# Patient Record
Sex: MALE | Race: WHITE | Employment: FULL TIME | URBAN - METROPOLITAN AREA
[De-identification: names, ages, dates, MRNs, and addresses within clinical notes are randomized per-mention and may not be internally consistent; named-entity substitution may affect disease eponyms.]

---

## 2017-11-14 ENCOUNTER — OFFICE VISIT (OUTPATIENT)
Dept: PODIATRY | Age: 52
End: 2017-11-14
Payer: COMMERCIAL

## 2017-11-14 VITALS
DIASTOLIC BLOOD PRESSURE: 93 MMHG | WEIGHT: 280 LBS | HEART RATE: 90 BPM | BODY MASS INDEX: 35.94 KG/M2 | SYSTOLIC BLOOD PRESSURE: 141 MMHG | HEIGHT: 74 IN

## 2017-11-14 DIAGNOSIS — Q82.8 POROKERATOSIS: ICD-10-CM

## 2017-11-14 DIAGNOSIS — L98.9 BENIGN SKIN LESION: Primary | ICD-10-CM

## 2017-11-14 DIAGNOSIS — M79.672 PAIN IN LEFT FOOT: ICD-10-CM

## 2017-11-14 DIAGNOSIS — L85.3 XEROSIS CUTIS: ICD-10-CM

## 2017-11-14 PROCEDURE — 4004F PT TOBACCO SCREEN RCVD TLK: CPT | Performed by: PODIATRIST

## 2017-11-14 PROCEDURE — G8484 FLU IMMUNIZE NO ADMIN: HCPCS | Performed by: PODIATRIST

## 2017-11-14 PROCEDURE — G8427 DOCREV CUR MEDS BY ELIG CLIN: HCPCS | Performed by: PODIATRIST

## 2017-11-14 PROCEDURE — 3017F COLORECTAL CA SCREEN DOC REV: CPT | Performed by: PODIATRIST

## 2017-11-14 PROCEDURE — 99203 OFFICE O/P NEW LOW 30 MIN: CPT | Performed by: PODIATRIST

## 2017-11-14 PROCEDURE — G8419 CALC BMI OUT NRM PARAM NOF/U: HCPCS | Performed by: PODIATRIST

## 2017-11-14 RX ORDER — UREA 40 %
CREAM (GRAM) TOPICAL
Qty: 1 TUBE | Refills: 3 | Status: SHIPPED | OUTPATIENT
Start: 2017-11-14 | End: 2019-05-08

## 2017-11-14 RX ORDER — ALBUTEROL SULFATE 90 UG/1
2 AEROSOL, METERED RESPIRATORY (INHALATION)
COMMUNITY
Start: 2017-10-09 | End: 2019-05-08

## 2017-11-14 RX ORDER — OMEPRAZOLE 20 MG/1
20 CAPSULE, DELAYED RELEASE ORAL
COMMUNITY
Start: 2017-09-07 | End: 2019-05-08

## 2017-11-14 RX ORDER — LOSARTAN POTASSIUM 50 MG/1
TABLET ORAL
COMMUNITY
Start: 2017-11-14 | End: 2019-04-03 | Stop reason: SDUPTHER

## 2017-11-14 RX ORDER — VARENICLINE TARTRATE 25 MG
KIT ORAL
COMMUNITY
Start: 2017-10-02 | End: 2019-05-08

## 2019-04-03 PROBLEM — R11.10 VOMITING: Status: ACTIVE | Noted: 2018-12-05

## 2019-04-03 PROBLEM — M54.41 CHRONIC RIGHT-SIDED LOW BACK PAIN WITH RIGHT-SIDED SCIATICA: Status: ACTIVE | Noted: 2017-03-09

## 2019-04-03 PROBLEM — K02.9 INFECTED DENTAL CARIES: Status: ACTIVE | Noted: 2018-04-04

## 2019-04-03 PROBLEM — J98.01 BRONCHOSPASM: Status: ACTIVE | Noted: 2018-12-05

## 2019-04-03 PROBLEM — I10 ESSENTIAL HYPERTENSION: Status: ACTIVE | Noted: 2017-09-07

## 2019-04-03 PROBLEM — Z13.220 SCREENING FOR LIPID DISORDERS: Status: ACTIVE | Noted: 2019-04-03

## 2019-04-03 PROBLEM — G89.29 CHRONIC RIGHT-SIDED LOW BACK PAIN WITH RIGHT-SIDED SCIATICA: Status: ACTIVE | Noted: 2017-03-09

## 2019-04-03 PROBLEM — Z76.89 ENCOUNTER TO ESTABLISH CARE WITH NEW DOCTOR: Status: ACTIVE | Noted: 2019-04-03

## 2019-04-03 PROBLEM — J44.1 COPD EXACERBATION (HCC): Status: ACTIVE | Noted: 2018-12-05

## 2019-04-03 PROBLEM — K04.7 INFECTED DENTAL CARIES: Status: ACTIVE | Noted: 2018-04-04

## 2019-04-03 PROBLEM — F17.210 CIGARETTE NICOTINE DEPENDENCE WITHOUT COMPLICATION: Status: ACTIVE | Noted: 2017-03-09

## 2019-04-03 PROBLEM — K21.9 GASTROESOPHAGEAL REFLUX DISEASE WITHOUT ESOPHAGITIS: Status: ACTIVE | Noted: 2017-09-07

## 2019-04-03 PROBLEM — E78.5 DYSLIPIDEMIA: Status: ACTIVE | Noted: 2018-04-04

## 2019-04-24 PROBLEM — M67.911 DYSFUNCTION OF RIGHT ROTATOR CUFF: Status: ACTIVE | Noted: 2019-04-24

## 2019-04-24 PROBLEM — M50.30 DDD (DEGENERATIVE DISC DISEASE), CERVICAL: Status: ACTIVE | Noted: 2019-04-24

## 2019-05-03 PROBLEM — Z13.220 SCREENING FOR LIPID DISORDERS: Status: RESOLVED | Noted: 2019-04-03 | Resolved: 2019-05-03

## 2019-05-08 PROBLEM — E66.811 CLASS 1 OBESITY DUE TO EXCESS CALORIES WITH SERIOUS COMORBIDITY AND BODY MASS INDEX (BMI) OF 33.0 TO 33.9 IN ADULT: Status: ACTIVE | Noted: 2019-05-08

## 2019-05-08 PROBLEM — E66.09 CLASS 1 OBESITY DUE TO EXCESS CALORIES WITH SERIOUS COMORBIDITY AND BODY MASS INDEX (BMI) OF 33.0 TO 33.9 IN ADULT: Status: ACTIVE | Noted: 2019-05-08

## 2019-06-19 PROBLEM — M75.81 ROTATOR CUFF TENDINITIS, RIGHT: Status: ACTIVE | Noted: 2019-04-24

## 2019-06-19 PROBLEM — Z01.818 PREOP EXAMINATION: Status: ACTIVE | Noted: 2019-06-19

## 2019-07-19 PROBLEM — Z01.818 PREOP EXAMINATION: Status: RESOLVED | Noted: 2019-06-19 | Resolved: 2019-07-19

## 2019-07-29 ENCOUNTER — OFFICE VISIT (OUTPATIENT)
Dept: PODIATRY | Age: 54
End: 2019-07-29
Payer: COMMERCIAL

## 2019-07-29 VITALS — HEIGHT: 74 IN | BODY MASS INDEX: 34.01 KG/M2 | WEIGHT: 265 LBS

## 2019-07-29 DIAGNOSIS — M79.675 PAIN IN TOES OF BOTH FEET: ICD-10-CM

## 2019-07-29 DIAGNOSIS — B35.1 ONYCHOMYCOSIS: Primary | ICD-10-CM

## 2019-07-29 DIAGNOSIS — L85.3 XEROSIS CUTIS: ICD-10-CM

## 2019-07-29 DIAGNOSIS — M79.674 PAIN IN TOES OF BOTH FEET: ICD-10-CM

## 2019-07-29 PROCEDURE — 11721 DEBRIDE NAIL 6 OR MORE: CPT | Performed by: PODIATRIST

## 2019-07-29 PROCEDURE — G8427 DOCREV CUR MEDS BY ELIG CLIN: HCPCS | Performed by: PODIATRIST

## 2019-07-29 PROCEDURE — 3017F COLORECTAL CA SCREEN DOC REV: CPT | Performed by: PODIATRIST

## 2019-07-29 PROCEDURE — 99213 OFFICE O/P EST LOW 20 MIN: CPT | Performed by: PODIATRIST

## 2019-07-29 PROCEDURE — G8417 CALC BMI ABV UP PARAM F/U: HCPCS | Performed by: PODIATRIST

## 2019-07-29 PROCEDURE — 4004F PT TOBACCO SCREEN RCVD TLK: CPT | Performed by: PODIATRIST

## 2019-07-29 RX ORDER — UREA 40 %
CREAM (GRAM) TOPICAL
Qty: 1 TUBE | Refills: 3 | Status: SHIPPED | OUTPATIENT
Start: 2019-07-29 | End: 2022-05-16

## 2019-07-29 RX ORDER — HYDROCODONE BITARTRATE AND ACETAMINOPHEN 5; 325 MG/1; MG/1
TABLET ORAL
COMMUNITY
End: 2020-10-21

## 2019-07-29 ASSESSMENT — ENCOUNTER SYMPTOMS
COLOR CHANGE: 0
DIARRHEA: 0
NAUSEA: 0
VOMITING: 0
CONSTIPATION: 0

## 2019-07-30 NOTE — PROGRESS NOTES
[facility]  Return Patient    Chief Complaint   Patient presents with    Nail Problem     b/l nail trim       Subjective: This Rowdy Whiting comes to clinic for foot and nail care. Pt currently has complaint of thickened, painful, elongated nails that he/she cannot manage by themselves. Pt. Relates pain to nails with shoe gear. Pt's primary care physician is Gamaliel Felix MD. Also has dry skin both feet. Past Medical History:   Diagnosis Date    Hypertension        No Known Allergies  Current Outpatient Medications on File Prior to Visit   Medication Sig Dispense Refill    HYDROcodone-acetaminophen (NORCO) 5-325 MG per tablet Norco 5 mg-325 mg tablet   Take 1 tablet every 6 hours by oral route.  budesonide-formoterol (SYMBICORT) 160-4.5 MCG/ACT AERO Inhale 2 puffs into the lungs 2 times daily 1 Inhaler 2    ranitidine (ZANTAC) 75 MG tablet Take 75 mg by mouth 2 times daily      losartan (COZAAR) 50 MG tablet Take 1 tablet by mouth daily 90 tablet 0     No current facility-administered medications on file prior to visit. Review of Systems   Constitutional: Negative for chills, diaphoresis, fatigue, fever and unexpected weight change. Cardiovascular: Negative for leg swelling. Gastrointestinal: Negative for constipation, diarrhea, nausea and vomiting. Musculoskeletal: Negative for arthralgias, gait problem and joint swelling. Skin: Negative for color change, pallor, rash and wound. Neurological: Negative for weakness and numbness. Objective:  General: AAO x 3 in NAD.     Derm  Toenail Description  Sites of Onychomycosis Involvement (Check affected area)  [x] [x] [x] [x] [x] [x] [x] [x] [x] [x]  5 4 3 2 1 1 2 3 4 5                          Right                                        Left    Thickness  [x] [x] [x] [x] [x] [x] [x] [x] [x] [x]  5 4 3 2 1 1 2 3 4 5                         Right                                        Left    Dystrophic Changes

## 2020-01-23 ENCOUNTER — OFFICE VISIT (OUTPATIENT)
Dept: FAMILY MEDICINE CLINIC | Age: 55
End: 2020-01-23
Payer: COMMERCIAL

## 2020-01-23 VITALS
HEIGHT: 74 IN | DIASTOLIC BLOOD PRESSURE: 87 MMHG | SYSTOLIC BLOOD PRESSURE: 146 MMHG | BODY MASS INDEX: 33.42 KG/M2 | OXYGEN SATURATION: 97 % | WEIGHT: 260.4 LBS | HEART RATE: 77 BPM

## 2020-01-23 PROCEDURE — 90686 IIV4 VACC NO PRSV 0.5 ML IM: CPT | Performed by: FAMILY MEDICINE

## 2020-01-23 PROCEDURE — 90471 IMMUNIZATION ADMIN: CPT | Performed by: FAMILY MEDICINE

## 2020-01-23 PROCEDURE — 4004F PT TOBACCO SCREEN RCVD TLK: CPT | Performed by: FAMILY MEDICINE

## 2020-01-23 PROCEDURE — G8926 SPIRO NO PERF OR DOC: HCPCS | Performed by: FAMILY MEDICINE

## 2020-01-23 PROCEDURE — 99204 OFFICE O/P NEW MOD 45 MIN: CPT | Performed by: FAMILY MEDICINE

## 2020-01-23 PROCEDURE — 3023F SPIROM DOC REV: CPT | Performed by: FAMILY MEDICINE

## 2020-01-23 PROCEDURE — 3017F COLORECTAL CA SCREEN DOC REV: CPT | Performed by: FAMILY MEDICINE

## 2020-01-23 PROCEDURE — G8427 DOCREV CUR MEDS BY ELIG CLIN: HCPCS | Performed by: FAMILY MEDICINE

## 2020-01-23 PROCEDURE — G8417 CALC BMI ABV UP PARAM F/U: HCPCS | Performed by: FAMILY MEDICINE

## 2020-01-23 PROCEDURE — G8482 FLU IMMUNIZE ORDER/ADMIN: HCPCS | Performed by: FAMILY MEDICINE

## 2020-01-23 RX ORDER — OMEPRAZOLE 20 MG/1
20 CAPSULE, DELAYED RELEASE ORAL DAILY
COMMUNITY
End: 2022-05-16 | Stop reason: ALTCHOICE

## 2020-01-23 RX ORDER — VARENICLINE TARTRATE 1 MG/1
1 TABLET, FILM COATED ORAL 2 TIMES DAILY
Qty: 60 TABLET | Refills: 5 | Status: SHIPPED | OUTPATIENT
Start: 2020-01-23

## 2020-01-23 RX ORDER — VARENICLINE TARTRATE 25 MG
KIT ORAL
Qty: 53 TABLET | Refills: 0 | Status: SHIPPED | OUTPATIENT
Start: 2020-01-23

## 2020-01-23 ASSESSMENT — PATIENT HEALTH QUESTIONNAIRE - PHQ9
1. LITTLE INTEREST OR PLEASURE IN DOING THINGS: 0
2. FEELING DOWN, DEPRESSED OR HOPELESS: 0
SUM OF ALL RESPONSES TO PHQ9 QUESTIONS 1 & 2: 0
SUM OF ALL RESPONSES TO PHQ QUESTIONS 1-9: 0
SUM OF ALL RESPONSES TO PHQ QUESTIONS 1-9: 0

## 2020-01-23 NOTE — PROGRESS NOTES
[facility]  [address] [place] [address] Dr ARTHUR [address] [address]  Dept: [phone]      Ibeth Benitez is a 47 y.o. male who presents today for follow up on his  medical conditions as noted below. Chief Complaint   Patient presents with   [facility] Establish Care       Patient Active Problem List:     BMI 33.0-33.9,adult     Bronchospasm     Chronic right-sided low back pain with right-sided sciatica     Cigarette nicotine dependence without complication     COPD exacerbation (HCC)     Dyslipidemia     Essential hypertension     Gastroesophageal reflux disease without esophagitis     Infected tooth     Vomiting     DDD (degenerative disc disease), cervical     Rotator cuff tendinitis, right     Class 1 obesity due to excess calories with serious comorbidity and body mass index (BMI) of 33.0 to 33.9 in adult     Past Medical History:   Diagnosis Date    Hypertension       Past Surgical History:   Procedure Laterality Date    HERNIA REPAIR       Family History   Problem Relation Age of Onset    High Blood Pressure Mother     Cancer Father         passed away from cancer    Diabetes Daughter        Current Outpatient Medications   Medication Sig Dispense Refill    omeprazole (PRILOSEC) 20 MG delayed release capsule Take 20 mg by mouth daily      varenicline (CHANTIX STARTING MONTH SARAHI) 0.5 MG X 11 & 1 MG X 42 tablet Take as directed on package.  53 tablet 0    varenicline (CHANTIX CONTINUING MONTH SARAHI) 1 MG tablet Take 1 tablet by mouth 2 times daily 60 tablet 5    albuterol sulfate  (90 Base) MCG/ACT inhaler Inhale 2 puffs into the lungs every 4 hours as needed for Wheezing or Shortness of Breath 1 Inhaler 3    budesonide-formoterol (SYMBICORT) 160-4.5 MCG/ACT AERO Inhale 2 puffs into the lungs 2 times daily 1 Inhaler 3    amLODIPine (NORVASC) 10 MG tablet Take 1 tablet by mouth daily 30 tablet 3    losartan (COZAAR) 50 MG tablet Take 1 tablet by mouth daily (Patient taking differently: Take 100 mg by mouth daily ) 90 tablet 0    Roflumilast (DALIRESP) 500 MCG tablet Take 1 tablet by mouth daily (Patient not taking: Reported on 1/23/2020) 30 tablet 3    HYDROcodone-acetaminophen (NORCO) 5-325 MG per tablet Norco 5 mg-325 mg tablet   Take 1 tablet every 6 hours by oral route.  Urea (CARMOL) 40 % cream Use daily as needed (Patient not taking: Reported on 1/23/2020) 1 Tube 3    ranitidine (ZANTAC) 75 MG tablet Take 75 mg by mouth 2 times daily       No current facility-administered medications for this visit. ALLERGIES:  No Known Allergies    Social History     Tobacco Use    Smoking status: Current Every Day Smoker     Packs/day: 1.00     Years: 40.00     Pack years: 40.00     Types: Cigarettes    Smokeless tobacco: Never Used   Substance Use Topics    Alcohol use: No        LDL Calculated (mg/dL)   Date Value   04/24/2019 99     HDL (mg/dL)   Date Value   04/24/2019 32 (A)     BUN (mg/dL)   Date Value   04/24/2019 8     CREATININE (no units)   Date Value   04/24/2019 1.07     Glucose (mg/dL)   Date Value   04/24/2019 92              Subjective:      HPI  He is here today as a new patient to establish care he has a history of high blood pressure amlodipine was recently added to his medication regime he states he did take his medicine this morning is also hypercholesterolemic he has COPD he is willing to try and quit smoking and he mentions that he is having some mild generalized lower abdominal discomfort and has had a couple days of rectal bleeding    Review of Systems:     Constitutional: Negative for fever, appetite change and fatigue. Family social and medical history reviewed and unchanged     HENT: Negative. Negative for nosebleeds, trouble swallowing and neck pain. Eyes: Negative for photophobia and visual disturbance. Respiratory: Negative. Negative for chest tightness and shortness of breath. Cardiovascular: Negative.

## 2020-01-23 NOTE — PROGRESS NOTES
Vaccine Information Sheet, \"Influenza - Inactivated\"  given to Clevester Claude, or parent/legal guardian of  Clevester Claude and verbalized understanding. Patient responses:    Have you ever had a reaction to a flu vaccine? No  Do you have any current illness? No  Have you ever had [place] Syndrome? No  Do you have a serious allergy to any of the following: Neomycin, Polymyxin, Thimerosal, eggs or egg products? No    Flu vaccine given per order. Please see immunization tab. Risks and benefits explained. Current VIS given.

## 2020-02-05 ENCOUNTER — HOSPITAL ENCOUNTER (OUTPATIENT)
Age: 55
Setting detail: SPECIMEN
Discharge: HOME | End: 2020-02-05
Payer: COMMERCIAL

## 2020-02-12 LAB — SURGICAL PATHOLOGY REPORT: NORMAL

## 2020-02-14 ENCOUNTER — TELEPHONE (OUTPATIENT)
Dept: FAMILY MEDICINE CLINIC | Age: 55
End: 2020-02-14

## 2020-02-14 NOTE — TELEPHONE ENCOUNTER
Patient's wife is calling and asking for him to have some nicotine patches- 21 mg. He had these when he was in the hospital at [facility]. They were really working good for him. She doesn't want him to smoke again. She gave him some really old ones she had and he has started smoke one to two cigarettes with these old patches per day.

## 2020-02-17 RX ORDER — NICOTINE 21 MG/24HR
1 PATCH, TRANSDERMAL 24 HOURS TRANSDERMAL EVERY 24 HOURS
Qty: 30 PATCH | Refills: 3 | COMMUNITY
Start: 2020-02-17 | End: 2020-02-18 | Stop reason: SDUPTHER

## 2020-02-17 RX ORDER — NICOTINE 21 MG/24HR
1 PATCH, TRANSDERMAL 24 HOURS TRANSDERMAL EVERY 24 HOURS
Qty: 30 PATCH | Refills: 3 | Status: SHIPPED | OUTPATIENT
Start: 2020-02-17 | End: 2021-02-16

## 2020-02-18 RX ORDER — NICOTINE 21 MG/24HR
1 PATCH, TRANSDERMAL 24 HOURS TRANSDERMAL EVERY 24 HOURS
Qty: 30 PATCH | Refills: 3 | Status: SHIPPED | OUTPATIENT
Start: 2020-02-18

## 2020-06-02 RX ORDER — LOSARTAN POTASSIUM 50 MG/1
100 TABLET ORAL DAILY
Qty: 180 TABLET | Refills: 0 | Status: SHIPPED | OUTPATIENT
Start: 2020-06-02 | End: 2022-05-16 | Stop reason: DRUGHIGH

## 2020-10-21 ENCOUNTER — OFFICE VISIT (OUTPATIENT)
Dept: PODIATRY | Age: 55
End: 2020-10-21
Payer: COMMERCIAL

## 2020-10-21 VITALS — HEIGHT: 74 IN | BODY MASS INDEX: 34.14 KG/M2 | WEIGHT: 266 LBS

## 2020-10-21 PROCEDURE — 11730 AVULSION NAIL PLATE SIMPLE 1: CPT | Performed by: PODIATRIST

## 2020-10-21 PROCEDURE — G8484 FLU IMMUNIZE NO ADMIN: HCPCS | Performed by: PODIATRIST

## 2020-10-21 PROCEDURE — G8417 CALC BMI ABV UP PARAM F/U: HCPCS | Performed by: PODIATRIST

## 2020-10-21 PROCEDURE — 99213 OFFICE O/P EST LOW 20 MIN: CPT | Performed by: PODIATRIST

## 2020-10-21 PROCEDURE — 11721 DEBRIDE NAIL 6 OR MORE: CPT | Performed by: PODIATRIST

## 2020-10-21 PROCEDURE — 17110 DESTRUCTION B9 LES UP TO 14: CPT | Performed by: PODIATRIST

## 2020-10-21 PROCEDURE — G8427 DOCREV CUR MEDS BY ELIG CLIN: HCPCS | Performed by: PODIATRIST

## 2020-10-21 PROCEDURE — 3017F COLORECTAL CA SCREEN DOC REV: CPT | Performed by: PODIATRIST

## 2020-10-21 PROCEDURE — 4004F PT TOBACCO SCREEN RCVD TLK: CPT | Performed by: PODIATRIST

## 2020-10-21 RX ORDER — TERBINAFINE HYDROCHLORIDE 250 MG/1
250 TABLET ORAL DAILY
Qty: 90 TABLET | Refills: 0 | Status: SHIPPED | OUTPATIENT
Start: 2020-10-21 | End: 2021-01-19

## 2020-10-21 ASSESSMENT — ENCOUNTER SYMPTOMS
DIARRHEA: 0
COLOR CHANGE: 0
NAUSEA: 0
CONSTIPATION: 0
VOMITING: 0

## 2020-10-21 NOTE — PROGRESS NOTES
[facility]  Return Patient    Chief Complaint   Patient presents with    Nail Problem     b/l nail trim/ last seen Dr. Sondra Antonio 1/23/20       Subjective: This Breanne Cole comes to clinic for foot and nail care. Pt currently has complaint of thickened, painful, elongated nails that he/she cannot manage by themselves. Pt. Relates pain to nails with shoe gear. Pt's primary care physician is [address] [place] [address], . Also has dry skin both feet. I have not seen him in over a year. He also has a very long, ingrown nail left 4th toe, he cannot cut. He has a painful callous left lateral foot. Past Medical History:   Diagnosis Date    Hypertension        No Known Allergies  Current Outpatient Medications on File Prior to Visit   Medication Sig Dispense Refill    Adalimumab 80 MG/0.8ML PNKT Humira(CF) Pen Crohn's-Ulc Colitis-Hid Sup Strt 80 mg/0.8 mL subcut kt      nicotine (NICODERM CQ) 21 MG/24HR Place 1 patch onto the skin every 24 hours 30 patch 3    nicotine (NICODERM CQ) 14 MG/24HR Place 1 patch onto the skin every 24 hours 30 patch 3    nicotine (NICODERM CQ) 7 MG/24HR Place 1 patch onto the skin every 24 hours 30 patch 3    omeprazole (PRILOSEC) 20 MG delayed release capsule Take 20 mg by mouth daily      varenicline (CHANTIX STARTING MONTH PAK) 0.5 MG X 11 & 1 MG X 42 tablet Take as directed on package.  53 tablet 0    varenicline (CHANTIX CONTINUING MONTH PAK) 1 MG tablet Take 1 tablet by mouth 2 times daily 60 tablet 5    albuterol sulfate  (90 Base) MCG/ACT inhaler Inhale 2 puffs into the lungs every 4 hours as needed for Wheezing or Shortness of Breath 1 Inhaler 3    budesonide-formoterol (SYMBICORT) 160-4.5 MCG/ACT AERO Inhale 2 puffs into the lungs 2 times daily 1 Inhaler 3    amLODIPine (NORVASC) 10 MG tablet Take 1 tablet by mouth daily 30 tablet 3    Roflumilast (DALIRESP) 500 MCG tablet Take 1 tablet by mouth daily 30 tablet 3    Urea (CARMOL) 40 % cream Use daily as needed 1 Tube 3    losartan (COZAAR) 50 MG tablet Take 2 tablets by mouth daily 180 tablet 0     No current facility-administered medications on file prior to visit. Review of Systems   Constitutional: Negative for chills, diaphoresis, fatigue, fever and unexpected weight change. Cardiovascular: Negative for leg swelling. Gastrointestinal: Negative for constipation, diarrhea, nausea and vomiting. Musculoskeletal: Negative for arthralgias, gait problem and joint swelling. Skin: Negative for color change, pallor, rash and wound. Neurological: Negative for weakness and numbness. Objective:  General: AAO x 3 in NAD. Derm  Toenail Description  Sites of Onychomycosis Involvement (Check affected area)  [x] [x] [x] [x] [x] [x] [x] [x] [x] [x]  5 4 3 2 1 1 2 3 4 5                          Right                                        Left    Thickness  [x] [x] [x] [x] [x] [x] [x] [x] [x] [x]  5 4 3 2 1 1 2 3 4 5                         Right                                        Left    Dystrophic Changes   [x] [x] [x] [x] [x] [x] [x] [x] [x] [x]  5 4 3 2 1 1 2 3 4 5                         Right                                        Left    Color  [x] [x] [x] [x] [x] [x] [x] [x] [x] [x]  5 4 3 2 1 1 2 3 4 5                          Right                                        Left    Incurvation/Ingrowin   [] [] [] [] [] [] [] [] [] []  5 4 3 2 1 1 2 3 4 5                         Right                                        Left    Inflammation/Pain   [x] [x] [x] [x] [x] [x] [x] [x] [x] [x]  5 4 3 2 1 1 2 3 4 5                         Right                                        Left       Nails are painful 1-10 with direct palpation. dry, hyperkeratotic skin bilateral feet. Hyperkeratotic lesion plantar left 5th met head with a hard core, painful.    Left 4th toenail extremely thick, incurvated, red, painful    Vascular:  DP/PT pulses palpable 2/4, Bilateral.    CFT <3 seconds to digits with antibiotic oinment/amerigel. The patient tolerated the procedure well without apparent complications. Oral and written instructions were dispensed. Discussed topical and oral antifungals. I have reviewed the patients past medical history and medication list, and I feel that he/she could take Lamisil as long as we monitor his/her liver. I will obtain labs, and if normal, will start Lamisil, one tablet daily for 90 days.        10/21/2020    Electronically signed by Calin Durham DPM on 10/21/2020 at 9:21 AM

## 2021-01-20 ENCOUNTER — OFFICE VISIT (OUTPATIENT)
Dept: PODIATRY | Age: 56
End: 2021-01-20
Payer: COMMERCIAL

## 2021-01-20 VITALS — WEIGHT: 266 LBS | BODY MASS INDEX: 34.14 KG/M2 | HEIGHT: 74 IN

## 2021-01-20 DIAGNOSIS — M79.675 PAIN IN TOES OF BOTH FEET: ICD-10-CM

## 2021-01-20 DIAGNOSIS — M79.672 PAIN IN LEFT FOOT: ICD-10-CM

## 2021-01-20 DIAGNOSIS — B35.1 ONYCHOMYCOSIS: Primary | ICD-10-CM

## 2021-01-20 DIAGNOSIS — M79.674 PAIN IN TOES OF BOTH FEET: ICD-10-CM

## 2021-01-20 DIAGNOSIS — D23.72 BENIGN NEOPLASM OF SKIN OF FOOT, LEFT: ICD-10-CM

## 2021-01-20 PROCEDURE — G8484 FLU IMMUNIZE NO ADMIN: HCPCS | Performed by: PODIATRIST

## 2021-01-20 PROCEDURE — 4004F PT TOBACCO SCREEN RCVD TLK: CPT | Performed by: PODIATRIST

## 2021-01-20 PROCEDURE — 3017F COLORECTAL CA SCREEN DOC REV: CPT | Performed by: PODIATRIST

## 2021-01-20 PROCEDURE — G8427 DOCREV CUR MEDS BY ELIG CLIN: HCPCS | Performed by: PODIATRIST

## 2021-01-20 PROCEDURE — G8417 CALC BMI ABV UP PARAM F/U: HCPCS | Performed by: PODIATRIST

## 2021-01-20 PROCEDURE — 99213 OFFICE O/P EST LOW 20 MIN: CPT | Performed by: PODIATRIST

## 2021-01-20 ASSESSMENT — ENCOUNTER SYMPTOMS
COLOR CHANGE: 0
VOMITING: 0
NAUSEA: 0
DIARRHEA: 0
CONSTIPATION: 0

## 2021-01-20 NOTE — PROGRESS NOTES
[facility]  Return Patient    Chief Complaint   Patient presents with   Darío Garcia     left foot     Nail Problem     nail trim/last saw Nolvia Mahmood 12/23/2020       Subjective: This Gonzalez Bombard comes to clinic for foot and nail care. Pt currently has complaint of thickened, painful, elongated nails that he/she cannot manage by themselves. Pt. Relates pain to nails with shoe gear. Pt's primary care physician is Vignesh Willingham DO. Also has dry skin both feet. Taking lamisil, has missed a few days. He has a painful callous left lateral foot. Past Medical History:   Diagnosis Date    Hypertension        No Known Allergies  Current Outpatient Medications on File Prior to Visit   Medication Sig Dispense Refill    Adalimumab 80 MG/0.8ML PNKT Humira(CF) Pen Crohn's-Ulc Colitis-Hid Sup Strt 80 mg/0.8 mL subcut kt      omeprazole (PRILOSEC) 20 MG delayed release capsule Take 20 mg by mouth daily      albuterol sulfate  (90 Base) MCG/ACT inhaler Inhale 2 puffs into the lungs every 4 hours as needed for Wheezing or Shortness of Breath 1 Inhaler 3    amLODIPine (NORVASC) 10 MG tablet Take 1 tablet by mouth daily 30 tablet 3    Roflumilast (DALIRESP) 500 MCG tablet Take 1 tablet by mouth daily 30 tablet 3    Urea (CARMOL) 40 % cream Use daily as needed 1 Tube 3    losartan (COZAAR) 50 MG tablet Take 2 tablets by mouth daily 180 tablet 0    nicotine (NICODERM CQ) 21 MG/24HR Place 1 patch onto the skin every 24 hours (Patient not taking: Reported on 1/20/2021) 30 patch 3    nicotine (NICODERM CQ) 14 MG/24HR Place 1 patch onto the skin every 24 hours (Patient not taking: Reported on 1/20/2021) 30 patch 3    nicotine (NICODERM CQ) 7 MG/24HR Place 1 patch onto the skin every 24 hours (Patient not taking: Reported on 1/20/2021) 30 patch 3    varenicline (CHANTIX STARTING MONTH SARAHI) 0.5 MG X 11 & 1 MG X 42 tablet Take as directed on package.  (Patient not taking: Reported on 1/20/2021) 53 tablet 0    varenicline (CHANTIX CONTINUING MONTH SARAHI) 1 MG tablet Take 1 tablet by mouth 2 times daily (Patient not taking: Reported on 1/20/2021) 60 tablet 5     No current facility-administered medications on file prior to visit. Review of Systems   Constitutional: Negative for chills, diaphoresis, fatigue, fever and unexpected weight change. Cardiovascular: Negative for leg swelling. Gastrointestinal: Negative for constipation, diarrhea, nausea and vomiting. Musculoskeletal: Negative for arthralgias, gait problem and joint swelling. Skin: Negative for color change, pallor, rash and wound. Neurological: Negative for weakness and numbness. Objective:  General: AAO x 3 in NAD. Derm  Toenail Description  Sites of Onychomycosis Involvement (Check affected area)  [x] [x] [x] [x] [x] [x] [x] [x] [x] [x]  5 4 3 2 1 1 2 3 4 5                          Right                                        Left    Thickness  [x] [x] [x] [x] [x] [x] [x] [x] [x] [x]  5 4 3 2 1 1 2 3 4 5                         Right                                        Left    Dystrophic Changes   [x] [x] [x] [x] [x] [x] [x] [x] [x] [x]  5 4 3 2 1 1 2 3 4 5                         Right                                        Left    Color  [x] [x] [x] [x] [x] [x] [x] [x] [x] [x]  5 4 3 2 1 1 2 3 4 5                          Right                                        Left    Incurvation/Ingrowin   [] [] [] [] [] [] [] [] [] []  5 4 3 2 1 1 2 3 4 5                         Right                                        Left    Inflammation/Pain   [x] [x] [x] [x] [x] [x] [x] [x] [x] [x]  5 4 3 2 1 1 2 3 4 5                         Right                                        Left       Nails are painful 1-10 with direct palpation. dry, hyperkeratotic skin bilateral feet. Hyperkeratotic lesion plantar left 5th met head with a hard core, painful.    Left 4th toenail extremely thick, incurvated, red, painful    Vascular:  DP/PT pulses palpable 2/4, Bilateral.    CFT <3 seconds to digits 1-5, Bilateral .   Hair growth present to level of digits, Bilateral.    Neurological:  Sensation present to light touch to level of digits, Bilateral.    Assessment:  54 y.o. male with:   1. Onychomycosis    2. Pain in toes of both feet    3. Benign neoplasm of skin of foot, left    4. Pain in left foot       No orders of the defined types were placed in this encounter. Plan:   Pt was evaluated and examined. Patient was given personalized discharge instructions. Nails 1-10 were debrided in length and thickness sharply with a nail nipper and  without incident. Pt will follow up in 3 months or sooner if any problems arise. Diagnosis was discussed with the pt and all of their questions were answered in detail. Proper foot hygiene and care was discussed with the pt. Patient to check feet daily and contact the office with any questions/problems/concerns. Other comorbidity noted and will be managed by PCP. Pain waiver discussed with patient and confirmed. The lesion(s) was partially debrided, enucleated, and silver nitrate was applied with an apperature pad under occlusion. The patient will leave in place for 24-48 hours and than remove. The patient tolerated the procedure well and without complication.          Finish lamisil      1/20/2021    Electronically signed by Daxa Diaz DPM on 1/20/2021 at 4:46 PM

## 2021-12-06 ENCOUNTER — OFFICE VISIT (OUTPATIENT)
Dept: INFECTIOUS DISEASES | Age: 56
End: 2021-12-06
Payer: COMMERCIAL

## 2021-12-06 VITALS
OXYGEN SATURATION: 97 % | SYSTOLIC BLOOD PRESSURE: 133 MMHG | HEART RATE: 74 BPM | WEIGHT: 266 LBS | BODY MASS INDEX: 34.14 KG/M2 | DIASTOLIC BLOOD PRESSURE: 88 MMHG | TEMPERATURE: 98.2 F | HEIGHT: 74 IN | RESPIRATION RATE: 18 BRPM

## 2021-12-06 DIAGNOSIS — R76.12 (QFT) QUANTIFERON-TB TEST REACTION WITHOUT ACTIVE TUBERCULOSIS: Primary | ICD-10-CM

## 2021-12-06 PROCEDURE — G8417 CALC BMI ABV UP PARAM F/U: HCPCS | Performed by: INTERNAL MEDICINE

## 2021-12-06 PROCEDURE — 3017F COLORECTAL CA SCREEN DOC REV: CPT | Performed by: INTERNAL MEDICINE

## 2021-12-06 PROCEDURE — 99205 OFFICE O/P NEW HI 60 MIN: CPT | Performed by: INTERNAL MEDICINE

## 2021-12-06 PROCEDURE — G8427 DOCREV CUR MEDS BY ELIG CLIN: HCPCS | Performed by: INTERNAL MEDICINE

## 2021-12-06 PROCEDURE — 4004F PT TOBACCO SCREEN RCVD TLK: CPT | Performed by: INTERNAL MEDICINE

## 2021-12-06 PROCEDURE — G8484 FLU IMMUNIZE NO ADMIN: HCPCS | Performed by: INTERNAL MEDICINE

## 2021-12-06 RX ORDER — ALPRAZOLAM 0.5 MG/1
TABLET ORAL
COMMUNITY
Start: 2021-10-26

## 2021-12-06 RX ORDER — NAPROXEN 500 MG/1
TABLET ORAL
COMMUNITY
Start: 2021-05-03 | End: 2022-05-16

## 2021-12-06 ASSESSMENT — ENCOUNTER SYMPTOMS
EYE DISCHARGE: 0
COLOR CHANGE: 0
APNEA: 0
DIARRHEA: 1

## 2021-12-06 NOTE — PROGRESS NOTES
[facility] - Initial Consult Note  Today's Date: 12/6/2021    Impression :   · New pt 12/6/21  · Ulcerative colitis - needs biologicals- failed Humira, needs another one, tiburcio Menezes [facility] gastroenterology  · + quatiferon TB [facility] 0.98/10 - never exposed to TB   · CT chest 11/1/21 - fully neg  · Past etoh heavy x many years,  Stopped 4 y ago-LFT normal    Recommendations   · Rifampin x 4 months  · if urgent to treat w diya garcia to start at end of the 3rd month of rifampin  ·    Diagnosis Orders   1. 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 History of Present Illness:   Navin Thomas is a 64y.o.-year-old  male who presents with   Chief Complaint   Patient presents with    Results     New patient    PPD Reading     New pt 12/6/21  · Ulcerative colitis - needs biologicals -failed Humira, needs another one, tiburcio Menezes [address] [facility] gastroenterology  · + quatiferon TB [facility] 0.98/10 - CT chest 11/1/21 - fully neg - did not have TB while on Humira x 18 months  · Past etoh use heavy - stopped 4 y ago - recent CT chest shows GB stones but no discussion about live issues - LFT all normal- doubt advanced liver disease    Still has diarreh and constipation laternating  Never exposed to TB  Looks healthy  PE neg and ROS neg    See me in 3 mo  rifampin 2 h after meal., 600 daily and LFT weekly          I have personally reviewed the past medical history, past surgical history, medications, social history, and family history, and I haveupdated the database accordingly.   Past Medical History:     Past Medical History:   Diagnosis Date    Hypertension        Past Surgical  History:     Past Surgical History:   Procedure Laterality Date    HERNIA REPAIR Medications:     Current Outpatient Medications:     rifAMPin (RIFADIN) 300 MG capsule, Take 2 capsules by mouth daily, Disp: 60 capsule, Rfl: 3    naproxen (NAPROSYN) 500 MG tablet, naproxen 500 mg tablet  TAKE 1 TABLET BY MOUTH EVERY TWELVE HOURS WITH FOOD AS NEEDED FOR PAIN, Disp: , Rfl:     ALPRAZolam (XANAX) 0.5 MG tablet, , Disp: , Rfl:     Adalimumab 80 MG/0.8ML PNKT, Humira(CF) Pen Crohn's-Ulc Colitis-Hid Sup Strt 80 mg/0.8 mL subcut kt, Disp: , Rfl:     losartan (COZAAR) 50 MG tablet, Take 2 tablets by mouth daily, Disp: 180 tablet, Rfl: 0    omeprazole (PRILOSEC) 20 MG delayed release capsule, Take 20 mg by mouth daily, Disp: , Rfl:     albuterol sulfate  (90 Base) MCG/ACT inhaler, Inhale 2 puffs into the lungs every 4 hours as needed for Wheezing or Shortness of Breath, Disp: 1 Inhaler, Rfl: 3    amLODIPine (NORVASC) 10 MG tablet, Take 1 tablet by mouth daily, Disp: 30 tablet, Rfl: 3    nicotine (NICODERM CQ) 21 MG/24HR, Place 1 patch onto the skin every 24 hours (Patient not taking: Reported on 1/20/2021), Disp: 30 patch, Rfl: 3    nicotine (NICODERM CQ) 14 MG/24HR, Place 1 patch onto the skin every 24 hours (Patient not taking: Reported on 1/20/2021), Disp: 30 patch, Rfl: 3    nicotine (NICODERM CQ) 7 MG/24HR, Place 1 patch onto the skin every 24 hours (Patient not taking: Reported on 1/20/2021), Disp: 30 patch, Rfl: 3    varenicline (CHANTIX STARTING MONTH SARAHI) 0.5 MG X 11 & 1 MG X 42 tablet, Take as directed on package.  (Patient not taking: Reported on 12/6/2021), Disp: 53 tablet, Rfl: 0    varenicline (CHANTIX CONTINUING MONTH SARAHI) 1 MG tablet, Take 1 tablet by mouth 2 times daily (Patient not taking: Reported on 1/20/2021), Disp: 60 tablet, Rfl: 5    Roflumilast (DALIRESP) 500 MCG tablet, Take 1 tablet by mouth daily (Patient not taking: Reported on 12/6/2021), Disp: 30 tablet, Rfl: 3    Urea (CARMOL) 40 % cream, Use daily as needed (Patient not taking: Reported on 12/6/2021), Disp: 1 Tube, Rfl: 3      Social History:     Social History     Socioeconomic History    Marital status:      Spouse name: Not on file    Number of children: Not on file    Years of education: Not on file    Highest education level: Not on file   Occupational History    Not on file   Tobacco Use    Smoking status: Current Every Day Smoker     Packs/day: 1.00     Years: 40.00     Pack years: 40.00     Types: Cigarettes     Start date: 12/6/1984    Smokeless tobacco: Never Used   Vaping Use    Vaping Use: Never used   Substance and Sexual Activity    Alcohol use: No    Drug use: No    Sexual activity: Not on file   Other Topics Concern    Not on file   Social History Narrative    Not on file     Social Determinants of Health     Financial Resource Strain:     Difficulty of Paying Living Expenses: Not on file   Food Insecurity:     Worried About Running Out of Food in the Last Year: Not on file    Juan Alberto of Food in the Last Year: Not on file   Transportation Needs:     Lack of Transportation (Medical): Not on file    Lack of Transportation (Non-Medical):  Not on file   Physical Activity:     Days of Exercise per Week: Not on file    Minutes of Exercise per Session: Not on file   Stress:     Feeling of Stress : Not on file   Social Connections:     Frequency of Communication with Friends and Family: Not on file    Frequency of Social Gatherings with Friends and Family: Not on file    Attends [employer] Services: Not on file    Active Member of Clubs or Organizations: Not on file    Attends Club or Organization Meetings: Not on file    Marital Status: Not on file   Intimate Partner Violence:     Fear of Current or Ex-Partner: Not on file    Emotionally Abused: Not on file    Physically Abused: Not on file    Sexually Abused: Not on file   Housing Stability:     Unable to Pay for Housing in the Last Year: Not on file    Number of [place] in the Last Year: Not on file    Unstable Housing in the Last Year: Not on file       Family History:     Family History   Problem Relation Age of Onset    High Blood Pressure Mother     Cancer Father         passed away from cancer    Diabetes Daughter         Allergies:   Patient has no known allergies. Review of Systems:   Review of Systems   Constitutional: Positive for activity change. HENT: Positive for congestion. Eyes: Negative for discharge. Respiratory: Negative for apnea. Cardiovascular: Negative for chest pain. Gastrointestinal: Positive for diarrhea. Endocrine: Negative for heat intolerance. Genitourinary: Negative for dysuria. Musculoskeletal: Negative for arthralgias. Skin: Negative for color change. Allergic/Immunologic: Negative for food allergies. Neurological: Negative for dizziness. Hematological: Negative for adenopathy. Physical Examination :   Blood pressure 133/88, pulse 74, temperature 98.2 °F (36.8 °C), temperature source Temporal, resp. rate 18, height 6' 2\" (1.88 m), weight 266 lb (120.7 kg), SpO2 97 %. Physical Exam  Constitutional:       Appearance: Normal appearance. He is not ill-appearing. HENT:      Head: Normocephalic and atraumatic. Nose: Nose normal.      Mouth/Throat:      Mouth: Mucous membranes are moist.   Eyes:      General: No scleral icterus. Conjunctiva/sclera: Conjunctivae normal.   Cardiovascular:      Rate and Rhythm: Normal rate and regular rhythm. Heart sounds: Normal heart sounds. No murmur heard. Pulmonary:      Effort: No respiratory distress. Breath sounds: Normal breath sounds. Abdominal:      General: There is no distension. Palpations: Abdomen is soft. There is no mass. Genitourinary:     Comments: No casey  Musculoskeletal:         General: No swelling or deformity. Cervical back: Neck supple. No rigidity. Skin:     General: Skin is dry. Coloration: Skin is not jaundiced.    Neurological: General: No focal deficit present. Mental Status: He is alert and oriented to person, place, and time. Psychiatric:         Mood and Affect: Mood normal.         Thought Content: Thought content normal.           Medical Decision Making:   I have independently reviewed/ordered the following labs:    CBCwith Differential:   Lab Results   Component Value Date    WBC 7.3 08/04/2015    HGB 14.2 08/04/2015    HCT 42.2 08/04/2015     08/04/2015    LYMPHOPCT 40 08/04/2015    MONOPCT 12 08/04/2015     BMP:  Lab Results   Component Value Date     08/04/2015    K 4.1 04/24/2019    K 3.8 08/04/2015    CL 99 08/04/2015    CO2 24 08/04/2015    BUN 8 04/24/2019    BUN 14 08/04/2015    CREATININE 1.07 04/24/2019    CREATININE 0.98 08/04/2015     Hepatic Function Panel:   Lab Results   Component Value Date    ALT 13 04/24/2019    AST 13 04/24/2019     No results found for: RPR  No results found for: HIV  No results found for: [facility]  Lab Results   Component Value Date    RBC 4.83 08/04/2015    WBC 7.3 08/04/2015     Lab Results   Component Value Date    CREATININE 1.07 04/24/2019    GLUCOSE 92 04/24/2019   you for allowing us to participate in the care of this patient. Please call with questions. Alfredito Saldana MD  - Office: [phone]    Please note that this chart was generated using voice recognition Dragon dictation software. Although every effort was made to ensure the accuracy of this automated transcription, some errors in transcription mayhave occurred.

## 2021-12-23 ENCOUNTER — TELEPHONE (OUTPATIENT)
Dept: INFECTIOUS DISEASES | Age: 56
End: 2021-12-23

## 2021-12-23 NOTE — TELEPHONE ENCOUNTER
atient called and the Rifampin is making him have a stomach ache and he is barely eating and has lost weight. He does not like taking it. Pls ask he takes w food and see if better. Otherwise Tell him the alternative will have to be x 9 months. Spoke to patient and informed.

## 2022-01-07 ENCOUNTER — TELEPHONE (OUTPATIENT)
Dept: INFECTIOUS DISEASES | Age: 57
End: 2022-01-07

## 2022-01-07 NOTE — TELEPHONE ENCOUNTER
Louisa Frankel, MD  2022 9:45 AM  RE: Marizol Carbajal. :[birth year]. CNP Lesia with [facility] [facility] called, needs to talk to you about him. Patient has been off biologics for a while, the prior auth for Nona Abarca is taking a while, patient is symptomatic and she is asking about steroids, needs your thoughts, opinion, etc. Please call her at the office #[phone] have them pull her out of a room, she said. If you have a hard time getting ahold of her, her cell phone is #[phone]. Derrick Hernandez  Read 2022 9:45 AM  2022 9:46 AM  Who is this Pt ? *Discussed w/self and Beatrice Gagnon wishes the CNP to call her on her cell when she is available. Called the office and gave Bharti Fabby her cell phone number. Love Mehta*

## 2022-05-16 ENCOUNTER — OFFICE VISIT (OUTPATIENT)
Dept: PODIATRY | Age: 57
End: 2022-05-16
Payer: COMMERCIAL

## 2022-05-16 VITALS — HEIGHT: 74 IN | BODY MASS INDEX: 34.14 KG/M2 | WEIGHT: 266 LBS

## 2022-05-16 DIAGNOSIS — M79.675 PAIN IN TOES OF BOTH FEET: ICD-10-CM

## 2022-05-16 DIAGNOSIS — L84 CORNS AND CALLOSITIES: ICD-10-CM

## 2022-05-16 DIAGNOSIS — L98.9 BENIGN SKIN LESION: ICD-10-CM

## 2022-05-16 DIAGNOSIS — M79.674 PAIN IN TOES OF BOTH FEET: ICD-10-CM

## 2022-05-16 DIAGNOSIS — M79.671 PAIN IN RIGHT FOOT: ICD-10-CM

## 2022-05-16 DIAGNOSIS — B35.1 ONYCHOMYCOSIS OF TOENAIL: Primary | ICD-10-CM

## 2022-05-16 DIAGNOSIS — M79.672 PAIN IN LEFT FOOT: ICD-10-CM

## 2022-05-16 PROCEDURE — 99999 PR OFFICE/OUTPT VISIT,PROCEDURE ONLY: CPT | Performed by: PODIATRIST

## 2022-05-16 PROCEDURE — 17110 DESTRUCTION B9 LES UP TO 14: CPT | Performed by: PODIATRIST

## 2022-05-16 PROCEDURE — 11721 DEBRIDE NAIL 6 OR MORE: CPT | Performed by: PODIATRIST

## 2022-05-16 PROCEDURE — 11056 PARNG/CUTG B9 HYPRKR LES 2-4: CPT | Performed by: PODIATRIST

## 2022-05-16 RX ORDER — VEDOLIZUMAB 300 MG/5ML
INJECTION, POWDER, LYOPHILIZED, FOR SOLUTION INTRAVENOUS
COMMUNITY
Start: 2022-03-21

## 2022-05-16 RX ORDER — LOSARTAN POTASSIUM 100 MG/1
TABLET ORAL
COMMUNITY
Start: 2022-05-02

## 2022-05-16 ASSESSMENT — ENCOUNTER SYMPTOMS
COLOR CHANGE: 0
DIARRHEA: 0
SHORTNESS OF BREATH: 0
BACK PAIN: 0
NAUSEA: 0

## 2022-05-16 NOTE — PROGRESS NOTES
SUBJECTIVE: Joey Valenzuela is a 64 y.o. male who returns to the office with chief complaint of painful fungal toenails. Patient relates toe nails are thickened/difficult to trim as well as painful with ambulation and with shoe gear. Patient also complains of painful callouses to both feet. Chief Complaint   Patient presents with   Cherylann Hayden     left foot     Nail Problem     nail trim/last saw [facility] 6/2/2021     Review of Systems   Constitutional: Negative for activity change, appetite change, chills, diaphoresis, fatigue and fever. Respiratory: Negative for shortness of breath. Cardiovascular: Negative for leg swelling. Gastrointestinal: Negative for diarrhea and nausea. Endocrine: Negative for cold intolerance, heat intolerance and polyuria. Musculoskeletal: Positive for arthralgias. Negative for back pain, gait problem, joint swelling and myalgias. Skin: Negative for color change, pallor, rash and wound. Allergic/Immunologic: Negative for environmental allergies and food allergies. Neurological: Negative for dizziness, weakness, light-headedness and numbness. Hematological: Does not bruise/bleed easily. Psychiatric/Behavioral: Negative for behavioral problems, confusion and self-injury. The patient is not nervous/anxious. OBJECTIVE: Clinical evaluation of patient reveals nails 1,2,3,4,5 of the right foot and nails 1,2,3,4,5 of the left foot to present with thickness, elongation, discoloration, brittleness, and subungual debris. There was pain with palpation and debridement of the toenails of the bilateral feet. No open lesions noted to either foot today. There is hyperkeratotic tissue formation noted submetatarsal head five of the left foot, to the plantar medial aspect of the bilateral first metatarsal head, and to the plantar medial aspect of the bilateral halluces. There is pain with direct palpation of the lesion(s).  Debridement of the lesion(s) with a fifteen blade does reveal a central core to the lesion submetatarsal head five of the left foot. The core of the lesion(s) was debrided with a fifteen blade. No signs of bacterial infection are noted to the lesion(s). Class A Findings (1 needed)   [] Non-traumatic amputation of foot or integral skeleton portion thereof. [] Q7.      Class B Findings (2 needed)   1. [] Absent posterior tibial pulse   2. [] Absent dorsalis pedis pulse   3. [] Advanced trophic changes; three of the following are required:   ·         [] hair growth (decrease or absence)   ·         [] nail changes (thickening)   ·         [] pigmentary changes (discoloration)   ·         [] skin texture (thin, shiny)   ·         [] skin color (rubor or redness)   [] Q8.      Class C Findings (1 Class B, 2 Class C needed)   1. [] Claudication   2. [] Temperature changes   3. [] Edema   4. [] Paresthesia   5. [] Burning   [] Q9.     NO CLASS FINDINGS ARE NOTED    ASSESSMENT:    Diagnosis Orders   1. Onychomycosis of toenail  KS DEBRIDEMENT OF NAILS, 6 OR MORE   2. Benign skin lesion  KS DESTRUCTION BENIGN LESIONS UP TO 14   3. Corns and callosities  TRIM BENIGN HYPERKERATOTIC SKIN LESION,2-4   4. Pain in toes of both feet  KS DEBRIDEMENT OF NAILS, 6 OR MORE   5. Pain in left foot  KS DESTRUCTION BENIGN LESIONS UP TO 14    TRIM BENIGN HYPERKERATOTIC SKIN LESION,2-4   6. Pain in right foot  TRIM BENIGN HYPERKERATOTIC SKIN LESION,2-4     PLAN: Toenails 1,2,3,4,5 of the right foot and 1,2,3,4,5 of the left foot were debrided in length and thickness using a nail nipper and a . Following debridement of benign skin lesions the areas were treated with Phenol and covered with Band-Aids. The lesion(s) to the bilateral foot debrided with a 15 blade down to healthy, pink skin without event. Return in about 9 weeks (around 7/18/2022) for Painful fungal nails, Painful callous(es), Benign skin lesion.    5/16/2022      Dallin Lo DPM

## 2022-05-31 ENCOUNTER — TELEPHONE (OUTPATIENT)
Dept: PODIATRY | Age: 57
End: 2022-05-31

## 2022-05-31 NOTE — TELEPHONE ENCOUNTER
Cheryl Wright called because she thought a prescription for Lamisil was supposed to be called in for Madiha. She said it was discussed at the last appointment.

## 2022-12-08 ENCOUNTER — OFFICE VISIT (OUTPATIENT)
Dept: PODIATRY | Age: 57
End: 2022-12-08

## 2022-12-08 VITALS — HEIGHT: 74 IN | WEIGHT: 285 LBS | BODY MASS INDEX: 36.57 KG/M2

## 2022-12-08 DIAGNOSIS — M79.672 PAIN IN LEFT FOOT: ICD-10-CM

## 2022-12-08 DIAGNOSIS — M79.661 PAIN IN BOTH LOWER LEGS: ICD-10-CM

## 2022-12-08 DIAGNOSIS — M79.89 LEG SWELLING: ICD-10-CM

## 2022-12-08 DIAGNOSIS — B35.1 ONYCHOMYCOSIS OF TOENAIL: ICD-10-CM

## 2022-12-08 DIAGNOSIS — L84 CORNS AND CALLOSITIES: ICD-10-CM

## 2022-12-08 DIAGNOSIS — B35.1 ONYCHOMYCOSIS: ICD-10-CM

## 2022-12-08 DIAGNOSIS — I87.2 VENOUS INSUFFICIENCY: Primary | ICD-10-CM

## 2022-12-08 DIAGNOSIS — M79.674 PAIN IN TOES OF BOTH FEET: ICD-10-CM

## 2022-12-08 DIAGNOSIS — M79.675 PAIN IN TOES OF BOTH FEET: ICD-10-CM

## 2022-12-08 DIAGNOSIS — M79.662 PAIN IN BOTH LOWER LEGS: ICD-10-CM

## 2022-12-08 DIAGNOSIS — D23.72 BENIGN NEOPLASM OF SKIN OF FOOT, LEFT: ICD-10-CM

## 2022-12-08 ASSESSMENT — ENCOUNTER SYMPTOMS
NAUSEA: 0
VOMITING: 0
CONSTIPATION: 0
COLOR CHANGE: 0
DIARRHEA: 0

## 2022-12-08 NOTE — PROGRESS NOTES
[facility]  Return Patient    Chief Complaint   Patient presents with    Callouses     B/l feet,last saw Acacia Huynh 6/2/21    Check-Up     Discoloration right foot        Subjective: This [facility] comes to clinic concerned about circulation. I have not seen him in over a year. He is concerned with purple color around ankles and feet, and swelling. Legs feel heavy at the end of the day. Drives truck, sits most of the day. Also here for foot and nail care. Pt currently has complaint of thickened, painful, elongated nails that he/she cannot manage by themselves. Pt. Relates pain to nails with shoe gear. Pt's primary care physician is Luisa Palacios. Also has dry skin both feet. Took lamisil, helped, but fungus came back      He has a painful callous left lateral foot. Past Medical History:   Diagnosis Date    Hypertension        No Known Allergies  Current Outpatient Medications on File Prior to Visit   Medication Sig Dispense Refill    losartan (COZAAR) 100 MG tablet take 1 tablet by mouth once daily      ENTYVIO 300 MG injection       albuterol sulfate  (90 Base) MCG/ACT inhaler Inhale 2 puffs into the lungs every 4 hours as needed for Wheezing or Shortness of Breath 1 Inhaler 3    amLODIPine (NORVASC) 10 MG tablet Take 1 tablet by mouth daily 30 tablet 3    Adalimumab 80 MG/0.8ML PNKT Humira(CF) Pen Crohn's-Ulc Colitis-Hid Sup Strt 80 mg/0.8 mL subcut kt      nicotine (NICODERM CQ) 21 MG/24HR Place 1 patch onto the skin every 24 hours (Patient not taking: No sig reported) 30 patch 3    nicotine (NICODERM CQ) 14 MG/24HR Place 1 patch onto the skin every 24 hours (Patient not taking: Reported on 1/20/2021) 30 patch 3    nicotine (NICODERM CQ) 7 MG/24HR Place 1 patch onto the skin every 24 hours (Patient not taking: Reported on 1/20/2021) 30 patch 3    varenicline (CHANTIX STARTING MONTH PAK) 0.5 MG X 11 & 1 MG X 42 tablet Take as directed on package.  (Patient not taking: No sig reported) 53 tablet 0    varenicline (CHANTIX CONTINUING MONTH SARAHI) 1 MG tablet Take 1 tablet by mouth 2 times daily (Patient not taking: No sig reported) 60 tablet 5    Roflumilast (DALIRESP) 500 MCG tablet Take 1 tablet by mouth daily (Patient not taking: No sig reported) 30 tablet 3     No current facility-administered medications on file prior to visit. Review of Systems   Constitutional:  Negative for chills, diaphoresis, fatigue, fever and unexpected weight change. Cardiovascular:  Negative for leg swelling. Gastrointestinal:  Negative for constipation, diarrhea, nausea and vomiting. Musculoskeletal:  Negative for arthralgias, gait problem and joint swelling. Skin:  Negative for color change, pallor, rash and wound. Neurological:  Negative for weakness and numbness. Objective:  General: AAO x 3 in NAD. Derm  Toenail Description  Sites of Onychomycosis Involvement (Check affected area)  [x] [x] [x] [x] [x] [x] [x] [x] [x] [x]  5 4 3 2 1 1 2 3 4 5                          Right                                        Left    Thickness  [x] [x] [x] [x] [x] [x] [x] [x] [x] [x]  5 4 3 2 1 1 2 3 4 5                         Right                                        Left    Dystrophic Changes   [x] [x] [x] [x] [x] [x] [x] [x] [x] [x]  5 4 3 2 1 1 2 3 4 5                         Right                                        Left    Color  [x] [x] [x] [x] [x] [x] [x] [x] [x] [x]  5 4 3 2 1 1 2 3 4 5                          Right                                        Left    Incurvation/Ingrowin   [] [] [] [] [] [] [] [] [] []  5 4 3 2 1 1 2 3 4 5                         Right                                        Left    Inflammation/Pain   [x] [x] [x] [x] [x] [x] [x] [x] [x] [x]  5 4 3 2 1 1 2 3 4 5                         Right                                        Left       Nails are painful 1-10 with direct palpation. dry, hyperkeratotic skin bilateral feet.    Hyperkeratotic lesion plantar left 5th met head with a hard core, painful. Left 4th toenail extremely thick, incurvated, red, painful    Vascular:  DP/PT pulses palpable 2/4, Bilateral.    CFT <3 seconds to digits 1-5, Bilateral .   Hair growth present to level of digits, Bilateral.  Varicosities present bilateral  Edema present bilateral.     Neurological:  Sensation present to light touch to level of digits, Bilateral.    Assessment:  62 y.o. male with:   1. Venous insufficiency    2. Onychomycosis    3. Leg swelling    4. Onychomycosis of toenail    5. Corns and callosities    6. Pain in toes of both feet    7. Benign neoplasm of skin of foot, left    8. Pain in left foot    9. Pain in both lower legs         Orders Placed This Encounter   Procedures    VL DUP LOWER EXTREMITY VENOUS BILATERAL     Standing Status:   Future     Standing Expiration Date:   12/8/2023    ALT     Standing Status:   Future     Standing Expiration Date:   12/8/2023    AST     Standing Status:   Future     Standing Expiration Date:   12/8/2023    CBC with Auto Differential     Standing Status:   Future     Standing Expiration Date:   12/8/2023    MI DEBRIDEMENT OF NAILS, 6 OR MORE           Plan:   Pt was evaluated and examined. Patient was given personalized discharge instructions. Nails 1-10 were debrided in length and thickness sharply with a nail nipper and  without incident. Pt will follow up in 3 months or sooner if any problems arise. Diagnosis was discussed with the pt and all of their questions were answered in detail. Proper foot hygiene and care was discussed with the pt. Patient to check feet daily and contact the office with any questions/problems/concerns. Other comorbidity noted and will be managed by PCP. Pain waiver discussed with patient and confirmed. The lesion(s) was partially debrided, enucleated, and silver nitrate was applied with an apperature pad under occlusion.  The patient will leave in place for 24-48 hours and than remove. The patient tolerated the procedure well and without complication. Rx Compression stockings  Discussed topical and oral antifungals. I have reviewed the patients past medical history and medication list, and I feel that he/she could take Lamisil as long as we monitor his/her liver. I will obtain labs, and if normal, will start Lamisil, one tablet daily for 90 days.          12/8/2022    Electronically signed by Yoel Alonso DPM on 12/8/2022 at 12:10 PM

## 2022-12-12 ENCOUNTER — HOSPITAL ENCOUNTER (OUTPATIENT)
Dept: VASCULAR LAB | Age: 57
Discharge: HOME | End: 2022-12-12
Payer: COMMERCIAL

## 2022-12-12 DIAGNOSIS — I87.2 VENOUS INSUFFICIENCY: ICD-10-CM

## 2022-12-12 DIAGNOSIS — M79.89 LEG SWELLING: ICD-10-CM

## 2022-12-12 PROCEDURE — 93970 EXTREMITY STUDY: CPT

## 2022-12-23 ENCOUNTER — TELEPHONE (OUTPATIENT)
Dept: PODIATRY | Age: 57
End: 2022-12-23

## 2022-12-23 DIAGNOSIS — M79.662 PAIN IN BOTH LOWER LEGS: ICD-10-CM

## 2022-12-23 DIAGNOSIS — M79.661 PAIN IN BOTH LOWER LEGS: ICD-10-CM

## 2022-12-23 DIAGNOSIS — I87.2 VENOUS INSUFFICIENCY: Primary | ICD-10-CM

## 2022-12-23 NOTE — TELEPHONE ENCOUNTER
Patient's wife called to get the results of his vascular testing and to see if he is able to start Lamisil. The blood work is scanned in under the media tab in his chart. Pended if appropriate.

## 2023-01-03 RX ORDER — TERBINAFINE HYDROCHLORIDE 250 MG/1
250 TABLET ORAL DAILY
Qty: 90 TABLET | Refills: 0 | Status: SHIPPED | OUTPATIENT
Start: 2023-01-03 | End: 2023-04-03

## 2023-09-05 ENCOUNTER — APPOINTMENT (OUTPATIENT)
Dept: GENERAL RADIOLOGY | Age: 58
End: 2023-09-05
Payer: COMMERCIAL

## 2023-09-05 ENCOUNTER — HOSPITAL ENCOUNTER (EMERGENCY)
Age: 58
Discharge: HOME | End: 2023-09-05
Attending: EMERGENCY MEDICINE
Payer: COMMERCIAL

## 2023-09-05 VITALS
SYSTOLIC BLOOD PRESSURE: 121 MMHG | OXYGEN SATURATION: 96 % | RESPIRATION RATE: 14 BRPM | BODY MASS INDEX: 35.94 KG/M2 | DIASTOLIC BLOOD PRESSURE: 69 MMHG | TEMPERATURE: 98.3 F | HEART RATE: 84 BPM | WEIGHT: 280 LBS | HEIGHT: 74 IN

## 2023-09-05 DIAGNOSIS — R00.2 PALPITATIONS: Primary | ICD-10-CM

## 2023-09-05 LAB
ANION GAP SERPL CALCULATED.3IONS-SCNC: 7 MMOL/L (ref 9–17)
BASOPHILS # BLD: 0.1 K/UL (ref 0–0.2)
BASOPHILS NFR BLD: 1 % (ref 0–2)
BUN SERPL-MCNC: 9 MG/DL (ref 6–20)
CALCIUM SERPL-MCNC: 10.5 MG/DL (ref 8.6–10.4)
CHLORIDE SERPL-SCNC: 98 MMOL/L (ref 98–107)
CO2 SERPL-SCNC: 31 MMOL/L (ref 20–31)
CREAT SERPL-MCNC: 1.3 MG/DL (ref 0.7–1.2)
EOSINOPHIL # BLD: 0.2 K/UL (ref 0–0.4)
EOSINOPHILS RELATIVE PERCENT: 2 % (ref 1–4)
ERYTHROCYTE [DISTWIDTH] IN BLOOD BY AUTOMATED COUNT: 14.4 % (ref 12.5–15.4)
GFR SERPL CREATININE-BSD FRML MDRD: >60 ML/MIN/1.73M2
GLUCOSE SERPL-MCNC: 84 MG/DL (ref 70–99)
HCT VFR BLD AUTO: 40.4 % (ref 41–53)
HGB BLD-MCNC: 14.1 G/DL (ref 13.5–17.5)
LYMPHOCYTES NFR BLD: 2.7 K/UL (ref 1–4.8)
LYMPHOCYTES RELATIVE PERCENT: 27 % (ref 24–44)
MAGNESIUM SERPL-MCNC: 2.2 MG/DL (ref 1.6–2.6)
MCH RBC QN AUTO: 30.9 PG (ref 26–34)
MCHC RBC AUTO-ENTMCNC: 35 G/DL (ref 31–37)
MCV RBC AUTO: 88.2 FL (ref 80–100)
MONOCYTES NFR BLD: 1 K/UL (ref 0.1–1.2)
MONOCYTES NFR BLD: 10 % (ref 2–11)
NEUTROPHILS NFR BLD: 60 % (ref 36–66)
NEUTS SEG NFR BLD: 6.1 K/UL (ref 1.8–7.7)
PLATELET # BLD AUTO: 259 K/UL (ref 140–450)
PMV BLD AUTO: 7.2 FL (ref 6–12)
POTASSIUM SERPL-SCNC: 3.5 MMOL/L (ref 3.7–5.3)
RBC # BLD AUTO: 4.58 M/UL (ref 4.5–5.9)
SODIUM SERPL-SCNC: 136 MMOL/L (ref 135–144)
TROPONIN I SERPL HS-MCNC: 8 NG/L (ref 0–22)
TSH SERPL DL<=0.05 MIU/L-ACNC: 3.14 UIU/ML (ref 0.3–5)
WBC OTHER # BLD: 10.1 K/UL (ref 3.5–11)

## 2023-09-05 PROCEDURE — 71046 X-RAY EXAM CHEST 2 VIEWS: CPT

## 2023-09-05 PROCEDURE — 80048 BASIC METABOLIC PNL TOTAL CA: CPT

## 2023-09-05 PROCEDURE — 36415 COLL VENOUS BLD VENIPUNCTURE: CPT

## 2023-09-05 PROCEDURE — 85025 COMPLETE CBC W/AUTO DIFF WBC: CPT

## 2023-09-05 PROCEDURE — 83735 ASSAY OF MAGNESIUM: CPT

## 2023-09-05 PROCEDURE — 99285 EMERGENCY DEPT VISIT HI MDM: CPT

## 2023-09-05 PROCEDURE — 93005 ELECTROCARDIOGRAM TRACING: CPT | Performed by: NURSE PRACTITIONER

## 2023-09-05 PROCEDURE — 84484 ASSAY OF TROPONIN QUANT: CPT

## 2023-09-05 PROCEDURE — 84443 ASSAY THYROID STIM HORMONE: CPT

## 2023-09-05 ASSESSMENT — ENCOUNTER SYMPTOMS
EYE DISCHARGE: 0
CONSTIPATION: 0
SHORTNESS OF BREATH: 0
DIARRHEA: 0
BACK PAIN: 0
VOMITING: 0
COUGH: 0
ABDOMINAL PAIN: 0
NAUSEA: 0

## 2023-09-05 ASSESSMENT — PAIN - FUNCTIONAL ASSESSMENT: PAIN_FUNCTIONAL_ASSESSMENT: NONE - DENIES PAIN

## 2023-09-05 NOTE — DISCHARGE INSTRUCTIONS
Return to ER: Chest pain or breathing difficulty, continued or worsening heart palpitations, weakness or confusion; or any other concerning symptoms

## 2023-09-05 NOTE — ED PROVIDER NOTES
[facility]      Pt Name: Ann Jolly  MRN: [id number]  [address] [place] 1965  Date of evaluation: 9/5/2023  Provider: SUKUMAR Clark CNP    CHIEF COMPLAINT       Chief Complaint   Patient presents with    Palpitations     Pt arrives with co \"feeling heart stop and go\" pt admits to having cardiologist in past however spouse admits to him being incompliant with care. HISTORY OF PRESENT ILLNESS   (Location/Symptom, Timing/Onset, Context/Setting, Quality, Duration, Modifying Factors, Severity)  Note limiting factors. Ann Jolly is a 62 y.o. male who presents to the emergency department      54-year-old male presenting via private auto with his wife for evaluation of her palpitations/feeling like his heart is racing; the patient reports that he has been having intermittent spells of these for years, today he had another spell where he felt like his heart was racing and he felt a funny beat, he had no chest pain shortness of breath or lightheaded or dizziness with this, states he was at rest when this occurred; the wife states that he was previously referred to cardiology for Holter monitor however when the Holter monitor arrived the patient did not wear a Holter monitor or refill with cardiology for her palpitations; the wife and the patient were concerned because they were continuing to happen prompting him to come to the emergency department for evaluation he has had no recent illnesses or fevers, states that he has no heart palpitations at this time. Patient denies illicit drug abuse or alcohol abuse; is a tobacco smoker. Patient has no known coronary artery disease. The history is provided by the patient and the spouse. Nursing Notes were reviewed. REVIEW OF SYSTEMS    (2-9 systems for level 4, 10 or more for level 5)     Review of Systems   Constitutional:  Negative for chills and fever.    HENT:  Negative for
COMMENTS:    Aditya Mishra with intermittent palpitations. Asymptomatic at this time. No chest pain. Slightly elevated creatinine. Slightly hypokalemic. Troponin negative. Has been going on for some time. I do not feel he requires a second cardiac enzyme. ECG shows no acute significant findings. Advise follow-up with cardiology and given referral information. They are comfortable with this plan. CONSULTS:    None    CRITICAL CARE:     None    PROCEDURES:    None    FINAL IMPRESSION      1.  Palpitations          DISPOSITION/PLAN   DISPOSITION Decision To Discharge 09/05/2023 07:28:14 PM      Condition on Disposition    Improved    PATIENT REFERRED TO:  Shobha Landin  [address]  [phone]    Schedule an appointment as soon as possible for a visit       Iris Lambert, [address] [facility] [address]  [facility] [address] [phone]    Schedule an appointment as soon as possible for a visit   For further evaluation of palpitations      DISCHARGE MEDICATIONS:  New Prescriptions    No medications on file       (Please note that portions of this note were completed with a voice recognition program.  Efforts were made to edit the dictations but occasionally words are mis-transcribed.)    Mechelle Cameron DO, DO  Attending Emergency Physician       Mechelle Cameron DO  09/05/23 [id number]

## 2023-09-06 LAB
EKG ATRIAL RATE: 88 BPM
EKG P AXIS: 36 DEGREES
EKG P-R INTERVAL: 148 MS
EKG Q-T INTERVAL: 358 MS
EKG QRS DURATION: 92 MS
EKG QTC CALCULATION (BAZETT): 433 MS
EKG R AXIS: -33 DEGREES
EKG T AXIS: 21 DEGREES
EKG VENTRICULAR RATE: 88 BPM

## 2024-04-30 ENCOUNTER — TELEPHONE (OUTPATIENT)
Dept: INTERVENTIONAL RADIOLOGY/VASCULAR | Age: 59
End: 2024-04-30

## 2024-05-02 NOTE — TELEPHONE ENCOUNTER
I spoke with the patients wife and said I would call her back after I set up the patients mri.  I have attempted on 5/1 and 5/2 no answer and voicemail is not set up.

## 2024-05-24 ENCOUNTER — ANESTHESIA EVENT (OUTPATIENT)
Dept: MRI IMAGING | Age: 59
End: 2024-05-24
Payer: COMMERCIAL

## 2024-05-24 ENCOUNTER — HOSPITAL ENCOUNTER (OUTPATIENT)
Dept: MRI IMAGING | Age: 59
End: 2024-05-24
Payer: COMMERCIAL

## 2024-05-24 ENCOUNTER — ANESTHESIA (OUTPATIENT)
Dept: MRI IMAGING | Age: 59
End: 2024-05-24
Payer: COMMERCIAL

## 2024-05-24 ENCOUNTER — HOSPITAL ENCOUNTER (OUTPATIENT)
Dept: INTERVENTIONAL RADIOLOGY/VASCULAR | Age: 59
End: 2024-05-24
Payer: COMMERCIAL

## 2024-05-24 VITALS
TEMPERATURE: 97.2 F | HEART RATE: 63 BPM | RESPIRATION RATE: 18 BRPM | DIASTOLIC BLOOD PRESSURE: 73 MMHG | OXYGEN SATURATION: 95 % | SYSTOLIC BLOOD PRESSURE: 108 MMHG

## 2024-05-24 VITALS
RESPIRATION RATE: 22 BRPM | OXYGEN SATURATION: 96 % | TEMPERATURE: 97.7 F | DIASTOLIC BLOOD PRESSURE: 87 MMHG | HEART RATE: 86 BPM | SYSTOLIC BLOOD PRESSURE: 115 MMHG

## 2024-05-24 DIAGNOSIS — S43.81XS: ICD-10-CM

## 2024-05-24 PROCEDURE — 7100000011 HC PHASE II RECOVERY - ADDTL 15 MIN

## 2024-05-24 PROCEDURE — 7100000001 HC PACU RECOVERY - ADDTL 15 MIN

## 2024-05-24 PROCEDURE — 6360000002 HC RX W HCPCS

## 2024-05-24 PROCEDURE — 6360000004 HC RX CONTRAST MEDICATION: Performed by: FAMILY MEDICINE

## 2024-05-24 PROCEDURE — 7100000000 HC PACU RECOVERY - FIRST 15 MIN

## 2024-05-24 PROCEDURE — 77002 NEEDLE LOCALIZATION BY XRAY: CPT

## 2024-05-24 PROCEDURE — 2580000003 HC RX 258

## 2024-05-24 PROCEDURE — 73222 MRI JOINT UPR EXTREM W/DYE: CPT

## 2024-05-24 PROCEDURE — 23350 INJECTION FOR SHOULDER X-RAY: CPT

## 2024-05-24 PROCEDURE — 2500000003 HC RX 250 WO HCPCS

## 2024-05-24 PROCEDURE — 7100000010 HC PHASE II RECOVERY - FIRST 15 MIN

## 2024-05-24 PROCEDURE — 3700000000 HC ANESTHESIA ATTENDED CARE

## 2024-05-24 PROCEDURE — 3700000001 HC ADD 15 MINUTES (ANESTHESIA)

## 2024-05-24 RX ORDER — SODIUM CHLORIDE, SODIUM LACTATE, POTASSIUM CHLORIDE, CALCIUM CHLORIDE 600; 310; 30; 20 MG/100ML; MG/100ML; MG/100ML; MG/100ML
INJECTION, SOLUTION INTRAVENOUS CONTINUOUS PRN
Status: DISCONTINUED | OUTPATIENT
Start: 2024-05-24 | End: 2024-05-24 | Stop reason: SDUPTHER

## 2024-05-24 RX ORDER — PHENYLEPHRINE HCL IN 0.9% NACL 1 MG/10 ML
SYRINGE (ML) INTRAVENOUS PRN
Status: DISCONTINUED | OUTPATIENT
Start: 2024-05-24 | End: 2024-05-24 | Stop reason: SDUPTHER

## 2024-05-24 RX ORDER — LIDOCAINE HYDROCHLORIDE 10 MG/ML
INJECTION, SOLUTION EPIDURAL; INFILTRATION; INTRACAUDAL; PERINEURAL PRN
Status: DISCONTINUED | OUTPATIENT
Start: 2024-05-24 | End: 2024-05-24 | Stop reason: SDUPTHER

## 2024-05-24 RX ORDER — SODIUM CHLORIDE 0.9 % (FLUSH) 0.9 %
5-40 SYRINGE (ML) INJECTION EVERY 12 HOURS SCHEDULED
Status: DISCONTINUED | OUTPATIENT
Start: 2024-05-24 | End: 2024-05-27 | Stop reason: HOSPADM

## 2024-05-24 RX ORDER — SODIUM CHLORIDE 9 MG/ML
INJECTION, SOLUTION INTRAVENOUS PRN
Status: DISCONTINUED | OUTPATIENT
Start: 2024-05-24 | End: 2024-05-27 | Stop reason: HOSPADM

## 2024-05-24 RX ORDER — MIDAZOLAM HYDROCHLORIDE 1 MG/ML
INJECTION INTRAMUSCULAR; INTRAVENOUS PRN
Status: DISCONTINUED | OUTPATIENT
Start: 2024-05-24 | End: 2024-05-24 | Stop reason: SDUPTHER

## 2024-05-24 RX ORDER — GLYCOPYRROLATE 0.2 MG/ML
INJECTION INTRAMUSCULAR; INTRAVENOUS PRN
Status: DISCONTINUED | OUTPATIENT
Start: 2024-05-24 | End: 2024-05-24 | Stop reason: SDUPTHER

## 2024-05-24 RX ORDER — SODIUM CHLORIDE 0.9 % (FLUSH) 0.9 %
5-40 SYRINGE (ML) INJECTION PRN
Status: DISCONTINUED | OUTPATIENT
Start: 2024-05-24 | End: 2024-05-27 | Stop reason: HOSPADM

## 2024-05-24 RX ORDER — PROPOFOL 10 MG/ML
INJECTION, EMULSION INTRAVENOUS PRN
Status: DISCONTINUED | OUTPATIENT
Start: 2024-05-24 | End: 2024-05-24 | Stop reason: SDUPTHER

## 2024-05-24 RX ORDER — ONDANSETRON 2 MG/ML
INJECTION INTRAMUSCULAR; INTRAVENOUS PRN
Status: DISCONTINUED | OUTPATIENT
Start: 2024-05-24 | End: 2024-05-24 | Stop reason: SDUPTHER

## 2024-05-24 RX ORDER — FENTANYL CITRATE 50 UG/ML
INJECTION, SOLUTION INTRAMUSCULAR; INTRAVENOUS PRN
Status: DISCONTINUED | OUTPATIENT
Start: 2024-05-24 | End: 2024-05-24 | Stop reason: SDUPTHER

## 2024-05-24 RX ORDER — DEXAMETHASONE SODIUM PHOSPHATE 10 MG/ML
INJECTION INTRAMUSCULAR; INTRAVENOUS PRN
Status: DISCONTINUED | OUTPATIENT
Start: 2024-05-24 | End: 2024-05-24 | Stop reason: SDUPTHER

## 2024-05-24 RX ORDER — NALOXONE HYDROCHLORIDE 0.4 MG/ML
INJECTION, SOLUTION INTRAMUSCULAR; INTRAVENOUS; SUBCUTANEOUS PRN
Status: DISCONTINUED | OUTPATIENT
Start: 2024-05-24 | End: 2024-05-27 | Stop reason: HOSPADM

## 2024-05-24 RX ADMIN — LIDOCAINE HYDROCHLORIDE 50 MG: 10 INJECTION, SOLUTION EPIDURAL; INFILTRATION; INTRACAUDAL; PERINEURAL at 12:26

## 2024-05-24 RX ADMIN — Medication 100 MCG: at 13:09

## 2024-05-24 RX ADMIN — Medication 100 MCG: at 12:40

## 2024-05-24 RX ADMIN — PROPOFOL 125 MCG/KG/MIN: 10 INJECTION, EMULSION INTRAVENOUS at 12:27

## 2024-05-24 RX ADMIN — PROPOFOL 100 MG: 10 INJECTION, EMULSION INTRAVENOUS at 12:32

## 2024-05-24 RX ADMIN — SODIUM CHLORIDE, POTASSIUM CHLORIDE, SODIUM LACTATE AND CALCIUM CHLORIDE: 600; 310; 30; 20 INJECTION, SOLUTION INTRAVENOUS at 12:21

## 2024-05-24 RX ADMIN — MIDAZOLAM 1 MG: 1 INJECTION INTRAMUSCULAR; INTRAVENOUS at 12:26

## 2024-05-24 RX ADMIN — ONDANSETRON 4 MG: 2 INJECTION INTRAMUSCULAR; INTRAVENOUS at 12:44

## 2024-05-24 RX ADMIN — FENTANYL CITRATE 50 MCG: 50 INJECTION, SOLUTION INTRAMUSCULAR; INTRAVENOUS at 12:33

## 2024-05-24 RX ADMIN — Medication 100 MCG: at 13:00

## 2024-05-24 RX ADMIN — Medication 100 MCG: at 12:54

## 2024-05-24 RX ADMIN — GLYCOPYRROLATE 0.2 MG: 0.2 INJECTION INTRAMUSCULAR; INTRAVENOUS at 12:40

## 2024-05-24 RX ADMIN — IOHEXOL 10 ML: 240 INJECTION, SOLUTION INTRATHECAL; INTRAVASCULAR; INTRAVENOUS; ORAL at 12:00

## 2024-05-24 RX ADMIN — DEXAMETHASONE SODIUM PHOSPHATE 10 MG: 10 INJECTION INTRAMUSCULAR; INTRAVENOUS at 12:44

## 2024-05-24 RX ADMIN — PROPOFOL 150 MG: 10 INJECTION, EMULSION INTRAVENOUS at 12:26

## 2024-05-24 ASSESSMENT — PAIN DESCRIPTION - ORIENTATION
ORIENTATION: RIGHT

## 2024-05-24 ASSESSMENT — PAIN SCALES - GENERAL
PAINLEVEL_OUTOF10: 4

## 2024-05-24 ASSESSMENT — PAIN DESCRIPTION - DESCRIPTORS
DESCRIPTORS: ACHING;DISCOMFORT

## 2024-05-24 ASSESSMENT — PAIN DESCRIPTION - LOCATION
LOCATION: SHOULDER

## 2024-05-24 ASSESSMENT — LIFESTYLE VARIABLES: SMOKING_STATUS: 1

## 2024-05-24 ASSESSMENT — PAIN - FUNCTIONAL ASSESSMENT: PAIN_FUNCTIONAL_ASSESSMENT: 0-10
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
## 2024-05-24 NOTE — FLOWSHEET NOTE
ARTHROGRAM WITH MRI TO FOLLOW:  right shoulder    MS RN  SM RT  JM RT  DR. AURELIA COLBY    PATIENT TO IR, IDENTIFIED, STABLE, ALLERGIES CONFIRMED. NO MONITORS AT THIS TIME. PATIENT POSITIONED SUPINE .     TIMEOUT PERFORMED. PREP TO RIGHT SHOULDER PER DR. COLBY  .ACCESS GAINED AND MEDICATION INJECTED AS ORDERED.     PATIENT TOLERATED WELL. BANDAGE TO PUNCTURE SITE. NO PROBLEMS NOTED. PATIENT STABLE WRITER TRANSPORTED PT TO MRI IN STRETCHER,PER ORDER

## 2024-05-24 NOTE — ANESTHESIA PRE PROCEDURE
Department of Anesthesiology  Preprocedure Note       Name:  Michael Rushing   Age:  58 y.o.  :  1965                                          MRN:  [id number]         Date:  2024      Surgeon: N/A    Procedure: MRI    Medications prior to admission:   Prior to Admission medications    Medication Sig Start Date End Date Taking? Authorizing Provider   Elastic Bandages & Supports (JOBST KNEE HIGH COMPRESSION SM) MISC Dispense Bilateral Jobst Below Knee 20-30mmHg compression stockings 22   Aamir Herrera DPM   losartan (COZAAR) 100 MG tablet take 1 tablet by mouth once daily 22   ProviderCristopher MD   ENTYVIO 300 MG injection  3/21/22   Cristopher Chawla MD   Adalimumab 80 MG/0.8ML PNKT Humira(CF) Pen Crohn's-Ulc Colitis-Hid Sup Strt 80 mg/0.8 mL subcut kt    ProviderCristopher MD   nicotine (NICODERM CQ) 21 MG/24HR Place 1 patch onto the skin every 24 hours  Patient not taking: No sig reported 20   Oksana Mcnally DO   nicotine (NICODERM CQ) 14 MG/24HR Place 1 patch onto the skin every 24 hours  Patient not taking: Reported on 2021  Oksana Mcnally DO   nicotine (NICODERM CQ) 7 MG/24HR Place 1 patch onto the skin every 24 hours  Patient not taking: Reported on 2021  Oksana Mcnally DO   varenicline (CHANTIX STARTING MONTH SARAHI) 0.5 MG X 11 & 1 MG X 42 tablet Take as directed on package.  Patient not taking: No sig reported 20   Oksana Mcnally DO   varenicline (CHANTIX CONTINUING MONTH SARAHI) 1 MG tablet Take 1 tablet by mouth 2 times daily  Patient not taking: No sig reported 20   Oksana Mcnally DO   albuterol sulfate  (90 Base) MCG/ACT inhaler Inhale 2 puffs into the lungs every 4 hours as needed for Wheezing or Shortness of Breath 10/28/19   Fabián Vieira MD   amLODIPine (NORVASC) 10 MG tablet Take 1 tablet by mouth daily 10/28/19   Fabián Vieira MD   Roflumilast (DALIRESP) 500 MCG tablet Take 1 tablet by

## 2024-05-24 NOTE — DISCHARGE INSTRUCTIONS
No alcoholic beverages, no driving or operating machinery, no making important decisions for 24 hours.   Drink plenty of fluids.  Urinate within 8 hours after surgery, if unable to urinate call your doctor  You may resume your usual activities and normal diet immediately after the exam.   On very rare occasions, a few patients experience side effects from the contrast material.  These may include nausea, headache, and pain at the site of injection.   It is very rare that patients experience hives, itchy eyes, or other allergic reactions to the contrast material. If you have allergic symptoms, go to nearest hospital.

## 2024-09-17 ENCOUNTER — APPOINTMENT (OUTPATIENT)
Dept: CT IMAGING | Age: 59
End: 2024-09-17
Payer: COMMERCIAL

## 2024-09-17 ENCOUNTER — HOSPITAL ENCOUNTER (EMERGENCY)
Age: 59
Discharge: HOME | End: 2024-09-17
Attending: EMERGENCY MEDICINE
Payer: COMMERCIAL

## 2024-09-17 VITALS
HEART RATE: 73 BPM | OXYGEN SATURATION: 96 % | WEIGHT: 270 LBS | TEMPERATURE: 98.2 F | BODY MASS INDEX: 34.65 KG/M2 | HEIGHT: 74 IN | DIASTOLIC BLOOD PRESSURE: 87 MMHG | RESPIRATION RATE: 16 BRPM | SYSTOLIC BLOOD PRESSURE: 137 MMHG

## 2024-09-17 DIAGNOSIS — K81.9 CHOLECYSTITIS: Primary | ICD-10-CM

## 2024-09-17 LAB
ALBUMIN SERPL-MCNC: 4.2 G/DL (ref 3.5–5.2)
ALBUMIN/GLOB SERPL: 1.3 {RATIO} (ref 1–2.5)
ALP SERPL-CCNC: 95 U/L (ref 40–129)
ALT SERPL-CCNC: 21 U/L (ref 5–41)
ANION GAP SERPL CALCULATED.3IONS-SCNC: 10 MMOL/L (ref 9–17)
AST SERPL-CCNC: 19 U/L
BASOPHILS # BLD: 0 K/UL (ref 0–0.2)
BASOPHILS NFR BLD: 0 % (ref 0–2)
BILIRUB SERPL-MCNC: 0.3 MG/DL (ref 0.3–1.2)
BUN SERPL-MCNC: 14 MG/DL (ref 6–20)
CALCIUM SERPL-MCNC: 10.3 MG/DL (ref 8.6–10.4)
CHLORIDE SERPL-SCNC: 101 MMOL/L (ref 98–107)
CO2 SERPL-SCNC: 26 MMOL/L (ref 20–31)
CREAT SERPL-MCNC: 1.2 MG/DL (ref 0.7–1.2)
EOSINOPHIL # BLD: 0 K/UL (ref 0–0.4)
EOSINOPHILS RELATIVE PERCENT: 0 % (ref 1–4)
ERYTHROCYTE [DISTWIDTH] IN BLOOD BY AUTOMATED COUNT: 14.4 % (ref 12.5–15.4)
GFR, ESTIMATED: 70 ML/MIN/1.73M2
GLUCOSE SERPL-MCNC: 114 MG/DL (ref 70–99)
HCT VFR BLD AUTO: 41.1 % (ref 41–53)
HGB BLD-MCNC: 14.5 G/DL (ref 13.5–17.5)
LACTATE BLDV-SCNC: 1 MMOL/L (ref 0.5–2.2)
LIPASE SERPL-CCNC: 117 U/L (ref 13–60)
LYMPHOCYTES NFR BLD: 1.4 K/UL (ref 1–4.8)
LYMPHOCYTES RELATIVE PERCENT: 15 % (ref 24–44)
MCH RBC QN AUTO: 31 PG (ref 26–34)
MCHC RBC AUTO-ENTMCNC: 35.4 G/DL (ref 31–37)
MCV RBC AUTO: 87.8 FL (ref 80–100)
MONOCYTES NFR BLD: 0.5 K/UL (ref 0.1–1.2)
MONOCYTES NFR BLD: 6 % (ref 2–11)
NEUTROPHILS NFR BLD: 79 % (ref 36–66)
NEUTS SEG NFR BLD: 7.4 K/UL (ref 1.8–7.7)
PLATELET # BLD AUTO: 271 K/UL (ref 140–450)
PMV BLD AUTO: 7 FL (ref 6–12)
POTASSIUM SERPL-SCNC: 4.2 MMOL/L (ref 3.7–5.3)
PROT SERPL-MCNC: 7.4 G/DL (ref 6.4–8.3)
RBC # BLD AUTO: 4.68 M/UL (ref 4.5–5.9)
SODIUM SERPL-SCNC: 137 MMOL/L (ref 135–144)
WBC OTHER # BLD: 9.4 K/UL (ref 3.5–11)

## 2024-09-17 PROCEDURE — 74177 CT ABD & PELVIS W/CONTRAST: CPT

## 2024-09-17 PROCEDURE — 6360000002 HC RX W HCPCS: Performed by: PHYSICIAN ASSISTANT

## 2024-09-17 PROCEDURE — 2580000003 HC RX 258: Performed by: PHYSICIAN ASSISTANT

## 2024-09-17 PROCEDURE — 96375 TX/PRO/DX INJ NEW DRUG ADDON: CPT | Performed by: STUDENT IN AN ORGANIZED HEALTH CARE EDUCATION/TRAINING PROGRAM

## 2024-09-17 PROCEDURE — 36415 COLL VENOUS BLD VENIPUNCTURE: CPT

## 2024-09-17 PROCEDURE — 96361 HYDRATE IV INFUSION ADD-ON: CPT | Performed by: STUDENT IN AN ORGANIZED HEALTH CARE EDUCATION/TRAINING PROGRAM

## 2024-09-17 PROCEDURE — 83605 ASSAY OF LACTIC ACID: CPT

## 2024-09-17 PROCEDURE — 6360000004 HC RX CONTRAST MEDICATION: Performed by: PHYSICIAN ASSISTANT

## 2024-09-17 PROCEDURE — 99285 EMERGENCY DEPT VISIT HI MDM: CPT | Performed by: STUDENT IN AN ORGANIZED HEALTH CARE EDUCATION/TRAINING PROGRAM

## 2024-09-17 PROCEDURE — 96374 THER/PROPH/DIAG INJ IV PUSH: CPT | Performed by: STUDENT IN AN ORGANIZED HEALTH CARE EDUCATION/TRAINING PROGRAM

## 2024-09-17 PROCEDURE — 83690 ASSAY OF LIPASE: CPT

## 2024-09-17 PROCEDURE — 85025 COMPLETE CBC W/AUTO DIFF WBC: CPT

## 2024-09-17 PROCEDURE — 80053 COMPREHEN METABOLIC PANEL: CPT

## 2024-09-17 RX ORDER — SODIUM CHLORIDE 0.9 % (FLUSH) 0.9 %
3 SYRINGE (ML) INJECTION EVERY 8 HOURS
Status: DISCONTINUED | OUTPATIENT
Start: 2024-09-17 | End: 2024-09-17 | Stop reason: HOSPADM

## 2024-09-17 RX ORDER — 0.9 % SODIUM CHLORIDE 0.9 %
80 INTRAVENOUS SOLUTION INTRAVENOUS ONCE
Status: DISCONTINUED | OUTPATIENT
Start: 2024-09-17 | End: 2024-09-17 | Stop reason: HOSPADM

## 2024-09-17 RX ORDER — IOPAMIDOL 755 MG/ML
75 INJECTION, SOLUTION INTRAVASCULAR
Status: COMPLETED | OUTPATIENT
Start: 2024-09-17 | End: 2024-09-17

## 2024-09-17 RX ORDER — ONDANSETRON 4 MG/1
4 TABLET, ORALLY DISINTEGRATING ORAL 3 TIMES DAILY PRN
Qty: 21 TABLET | Refills: 0 | Status: SHIPPED | OUTPATIENT
Start: 2024-09-17

## 2024-09-17 RX ORDER — SODIUM CHLORIDE 0.9 % (FLUSH) 0.9 %
10 SYRINGE (ML) INJECTION ONCE
Status: COMPLETED | OUTPATIENT
Start: 2024-09-17 | End: 2024-09-17

## 2024-09-17 RX ORDER — ONDANSETRON 2 MG/ML
4 INJECTION INTRAMUSCULAR; INTRAVENOUS ONCE
Status: COMPLETED | OUTPATIENT
Start: 2024-09-17 | End: 2024-09-17

## 2024-09-17 RX ORDER — 0.9 % SODIUM CHLORIDE 0.9 %
1000 INTRAVENOUS SOLUTION INTRAVENOUS ONCE
Status: COMPLETED | OUTPATIENT
Start: 2024-09-17 | End: 2024-09-17

## 2024-09-17 RX ORDER — MORPHINE SULFATE 4 MG/ML
4 INJECTION, SOLUTION INTRAMUSCULAR; INTRAVENOUS ONCE
Status: COMPLETED | OUTPATIENT
Start: 2024-09-17 | End: 2024-09-17

## 2024-09-17 RX ADMIN — ONDANSETRON 4 MG: 2 INJECTION INTRAMUSCULAR; INTRAVENOUS at 13:42

## 2024-09-17 RX ADMIN — IOPAMIDOL 75 ML: 755 INJECTION, SOLUTION INTRAVENOUS at 16:01

## 2024-09-17 RX ADMIN — SODIUM CHLORIDE, PRESERVATIVE FREE 10 ML: 5 INJECTION INTRAVENOUS at 16:02

## 2024-09-17 RX ADMIN — MORPHINE SULFATE 4 MG: 4 INJECTION, SOLUTION INTRAMUSCULAR; INTRAVENOUS at 14:57

## 2024-09-17 RX ADMIN — Medication 80 ML: at 16:02

## 2024-09-17 RX ADMIN — SODIUM CHLORIDE 1000 ML: 9 INJECTION, SOLUTION INTRAVENOUS at 13:42

## 2024-09-17 RX ADMIN — HYDROMORPHONE HYDROCHLORIDE 0.5 MG: 1 INJECTION, SOLUTION INTRAMUSCULAR; INTRAVENOUS; SUBCUTANEOUS at 16:56

## 2024-09-17 ASSESSMENT — PAIN DESCRIPTION - LOCATION
LOCATION: BACK
LOCATION: ABDOMEN

## 2024-09-17 ASSESSMENT — PAIN SCALES - GENERAL
PAINLEVEL_OUTOF10: 7
PAINLEVEL_OUTOF10: 10
PAINLEVEL_OUTOF10: 9

## 2024-09-17 ASSESSMENT — PAIN - FUNCTIONAL ASSESSMENT: PAIN_FUNCTIONAL_ASSESSMENT: 0-10

## 2025-01-03 ENCOUNTER — APPOINTMENT (OUTPATIENT)
Dept: CT IMAGING | Age: 60
End: 2025-01-03
Payer: COMMERCIAL

## 2025-01-03 ENCOUNTER — HOSPITAL ENCOUNTER (EMERGENCY)
Age: 60
Discharge: HOME | End: 2025-01-03
Attending: EMERGENCY MEDICINE
Payer: COMMERCIAL

## 2025-01-03 VITALS
HEIGHT: 74 IN | BODY MASS INDEX: 35.94 KG/M2 | RESPIRATION RATE: 18 BRPM | DIASTOLIC BLOOD PRESSURE: 102 MMHG | OXYGEN SATURATION: 97 % | SYSTOLIC BLOOD PRESSURE: 145 MMHG | HEART RATE: 72 BPM | TEMPERATURE: 97.5 F | WEIGHT: 280 LBS

## 2025-01-03 DIAGNOSIS — R10.10 PAIN OF UPPER ABDOMEN: Primary | ICD-10-CM

## 2025-01-03 LAB
ALBUMIN SERPL-MCNC: 3.7 G/DL (ref 3.5–5.2)
ALBUMIN/GLOB SERPL: 1 {RATIO} (ref 1–2.5)
ALP SERPL-CCNC: 107 U/L (ref 40–129)
ALT SERPL-CCNC: 13 U/L (ref 5–41)
ANION GAP SERPL CALCULATED.3IONS-SCNC: 10 MMOL/L (ref 9–17)
AST SERPL-CCNC: 13 U/L
BASOPHILS # BLD: 0.1 K/UL (ref 0–0.2)
BASOPHILS NFR BLD: 1 % (ref 0–2)
BILIRUB DIRECT SERPL-MCNC: <0.1 MG/DL
BILIRUB INDIRECT SERPL-MCNC: NORMAL MG/DL (ref 0–1)
BILIRUB SERPL-MCNC: 0.3 MG/DL (ref 0.3–1.2)
BUN SERPL-MCNC: 14 MG/DL (ref 6–20)
CALCIUM SERPL-MCNC: 10.1 MG/DL (ref 8.6–10.4)
CHLORIDE SERPL-SCNC: 102 MMOL/L (ref 98–107)
CO2 SERPL-SCNC: 25 MMOL/L (ref 20–31)
CREAT SERPL-MCNC: 1.3 MG/DL (ref 0.7–1.2)
EOSINOPHIL # BLD: 0.2 K/UL (ref 0–0.4)
EOSINOPHILS RELATIVE PERCENT: 2 % (ref 1–4)
ERYTHROCYTE [DISTWIDTH] IN BLOOD BY AUTOMATED COUNT: 14 % (ref 12.5–15.4)
GFR, ESTIMATED: 63 ML/MIN/1.73M2
GLUCOSE SERPL-MCNC: 111 MG/DL (ref 70–99)
HCT VFR BLD AUTO: 40.6 % (ref 41–53)
HGB BLD-MCNC: 14.5 G/DL (ref 13.5–17.5)
LIPASE SERPL-CCNC: 40 U/L (ref 13–60)
LYMPHOCYTES NFR BLD: 1.6 K/UL (ref 1–4.8)
LYMPHOCYTES RELATIVE PERCENT: 21 % (ref 24–44)
MCH RBC QN AUTO: 31 PG (ref 26–34)
MCHC RBC AUTO-ENTMCNC: 35.6 G/DL (ref 31–37)
MCV RBC AUTO: 87.1 FL (ref 80–100)
MONOCYTES NFR BLD: 0.7 K/UL (ref 0.1–1.2)
MONOCYTES NFR BLD: 9 % (ref 2–11)
NEUTROPHILS NFR BLD: 67 % (ref 36–66)
NEUTS SEG NFR BLD: 5.1 K/UL (ref 1.8–7.7)
PLATELET # BLD AUTO: 248 K/UL (ref 140–450)
PMV BLD AUTO: 7 FL (ref 6–12)
POTASSIUM SERPL-SCNC: 4.1 MMOL/L (ref 3.7–5.3)
PROT SERPL-MCNC: 7.3 G/DL (ref 6.4–8.3)
RBC # BLD AUTO: 4.66 M/UL (ref 4.5–5.9)
SODIUM SERPL-SCNC: 137 MMOL/L (ref 135–144)
WBC OTHER # BLD: 7.6 K/UL (ref 3.5–11)

## 2025-01-03 PROCEDURE — 96374 THER/PROPH/DIAG INJ IV PUSH: CPT

## 2025-01-03 PROCEDURE — 80048 BASIC METABOLIC PNL TOTAL CA: CPT

## 2025-01-03 PROCEDURE — 99284 EMERGENCY DEPT VISIT MOD MDM: CPT

## 2025-01-03 PROCEDURE — 85025 COMPLETE CBC W/AUTO DIFF WBC: CPT

## 2025-01-03 PROCEDURE — 74176 CT ABD & PELVIS W/O CONTRAST: CPT

## 2025-01-03 PROCEDURE — 83690 ASSAY OF LIPASE: CPT

## 2025-01-03 PROCEDURE — 80076 HEPATIC FUNCTION PANEL: CPT

## 2025-01-03 PROCEDURE — 96361 HYDRATE IV INFUSION ADD-ON: CPT

## 2025-01-03 PROCEDURE — 36415 COLL VENOUS BLD VENIPUNCTURE: CPT

## 2025-01-03 PROCEDURE — 96375 TX/PRO/DX INJ NEW DRUG ADDON: CPT

## 2025-01-03 PROCEDURE — 2580000003 HC RX 258: Performed by: EMERGENCY MEDICINE

## 2025-01-03 PROCEDURE — 6360000002 HC RX W HCPCS: Performed by: EMERGENCY MEDICINE

## 2025-01-03 RX ORDER — PANTOPRAZOLE SODIUM 40 MG/1
40 TABLET, DELAYED RELEASE ORAL DAILY
Qty: 30 TABLET | Refills: 0 | Status: SHIPPED | OUTPATIENT
Start: 2025-01-03 | End: 2025-02-02

## 2025-01-03 RX ORDER — 0.9 % SODIUM CHLORIDE 0.9 %
1000 INTRAVENOUS SOLUTION INTRAVENOUS ONCE
Status: COMPLETED | OUTPATIENT
Start: 2025-01-03 | End: 2025-01-03

## 2025-01-03 RX ORDER — ONDANSETRON 2 MG/ML
4 INJECTION INTRAMUSCULAR; INTRAVENOUS ONCE
Status: COMPLETED | OUTPATIENT
Start: 2025-01-03 | End: 2025-01-03

## 2025-01-03 RX ADMIN — ONDANSETRON 4 MG: 2 INJECTION INTRAMUSCULAR; INTRAVENOUS at 08:51

## 2025-01-03 RX ADMIN — HYDROMORPHONE HYDROCHLORIDE 0.5 MG: 1 INJECTION, SOLUTION INTRAMUSCULAR; INTRAVENOUS; SUBCUTANEOUS at 08:52

## 2025-01-03 RX ADMIN — SODIUM CHLORIDE 1000 ML: 9 INJECTION, SOLUTION INTRAVENOUS at 08:50

## 2025-01-03 ASSESSMENT — PAIN - FUNCTIONAL ASSESSMENT: PAIN_FUNCTIONAL_ASSESSMENT: 0-10

## 2025-01-03 ASSESSMENT — PAIN DESCRIPTION - LOCATION: LOCATION: ABDOMEN

## 2025-01-03 ASSESSMENT — PAIN SCALES - GENERAL: PAINLEVEL_OUTOF10: 8

## 2025-01-03 NOTE — DISCHARGE INSTRUCTIONS
Avoid fatty, greasy foods.  Follow-up with your gastroenterologist in a week's time.  Return anytime for worsening symptoms
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
